# Patient Record
Sex: FEMALE | Race: WHITE | Employment: OTHER | URBAN - METROPOLITAN AREA
[De-identification: names, ages, dates, MRNs, and addresses within clinical notes are randomized per-mention and may not be internally consistent; named-entity substitution may affect disease eponyms.]

---

## 2019-10-25 DIAGNOSIS — M81.0 OSTEOPOROSIS, UNSPECIFIED OSTEOPOROSIS TYPE, UNSPECIFIED PATHOLOGICAL FRACTURE PRESENCE: ICD-10-CM

## 2019-10-30 ENCOUNTER — HOSPITAL ENCOUNTER (OUTPATIENT)
Age: 74
Discharge: HOME | End: 2019-11-01

## 2019-10-30 PROCEDURE — 88173 CYTOPATH EVAL FNA REPORT: CPT

## 2019-10-30 PROCEDURE — 88305 TISSUE EXAM BY PATHOLOGIST: CPT

## 2020-03-19 ENCOUNTER — APPOINTMENT (OUTPATIENT)
Dept: CT IMAGING | Age: 75
DRG: 193 | End: 2020-03-19
Payer: MEDICARE

## 2020-03-19 ENCOUNTER — APPOINTMENT (OUTPATIENT)
Dept: GENERAL RADIOLOGY | Age: 75
DRG: 193 | End: 2020-03-19
Payer: MEDICARE

## 2020-03-19 ENCOUNTER — HOSPITAL ENCOUNTER (INPATIENT)
Age: 75
LOS: 18 days | Discharge: HOME HEALTH | DRG: 193 | End: 2020-04-06
Attending: EMERGENCY MEDICINE | Admitting: INTERNAL MEDICINE
Payer: MEDICARE

## 2020-03-19 DIAGNOSIS — J18.9 PNEUMONIA DUE TO INFECTIOUS ORGANISM, UNSPECIFIED LATERALITY, UNSPECIFIED PART OF LUNG: Primary | ICD-10-CM

## 2020-03-19 LAB
ADENOVIRUS BY PCR: NOT DETECTED
ALBUMIN SERPL-MCNC: 3.7 G/DL (ref 3.5–5.2)
ALP BLD-CCNC: 88 U/L (ref 35–104)
ALT SERPL-CCNC: 40 U/L (ref 0–32)
ANION GAP SERPL CALCULATED.3IONS-SCNC: 18 MMOL/L (ref 7–16)
AST SERPL-CCNC: 54 U/L (ref 0–31)
B.E.: 1.8 MMOL/L (ref -3–0)
BASOPHILS ABSOLUTE: 0.01 E9/L (ref 0–0.2)
BASOPHILS RELATIVE PERCENT: 0.1 % (ref 0–2)
BILIRUB SERPL-MCNC: 0.4 MG/DL (ref 0–1.2)
BORDETELLA PARAPERTUSSIS BY PCR: NOT DETECTED
BORDETELLA PERTUSSIS BY PCR: NOT DETECTED
BUN BLDV-MCNC: 9 MG/DL (ref 8–23)
CALCIUM SERPL-MCNC: 8.4 MG/DL (ref 8.6–10.2)
CHLAMYDOPHILIA PNEUMONIAE BY PCR: NOT DETECTED
CHLORIDE BLD-SCNC: 97 MMOL/L (ref 98–107)
CO2: 20 MMOL/L (ref 22–29)
CORONAVIRUS 229E BY PCR: NOT DETECTED
CORONAVIRUS HKU1 BY PCR: NOT DETECTED
CORONAVIRUS NL63 BY PCR: NOT DETECTED
CORONAVIRUS OC43 BY PCR: NOT DETECTED
CREAT SERPL-MCNC: 0.8 MG/DL (ref 0.5–1)
CRITICAL NOTIFICATION: YES
DEVICE: ABNORMAL
EOSINOPHILS ABSOLUTE: 0 E9/L (ref 0.05–0.5)
EOSINOPHILS RELATIVE PERCENT: 0 % (ref 0–6)
GFR AFRICAN AMERICAN: >60
GFR NON-AFRICAN AMERICAN: >60 ML/MIN/1.73
GLUCOSE BLD-MCNC: 129 MG/DL (ref 74–99)
HCO3 ARTERIAL: 20.8 MMOL/L (ref 22–26)
HCT VFR BLD CALC: 40 % (ref 34–48)
HEMOGLOBIN: 13.9 G/DL (ref 11.5–15.5)
HUMAN METAPNEUMOVIRUS BY PCR: NOT DETECTED
HUMAN RHINOVIRUS/ENTEROVIRUS BY PCR: NOT DETECTED
IMMATURE GRANULOCYTES #: 0.03 E9/L
IMMATURE GRANULOCYTES %: 0.3 % (ref 0–5)
INFLUENZA A BY PCR: NOT DETECTED
INFLUENZA A BY PCR: NOT DETECTED
INFLUENZA B BY PCR: NOT DETECTED
INFLUENZA B BY PCR: NOT DETECTED
LACTIC ACID: 1.7 MMOL/L (ref 0.5–2.2)
LYMPHOCYTES ABSOLUTE: 0.53 E9/L (ref 1.5–4)
LYMPHOCYTES RELATIVE PERCENT: 5.9 % (ref 20–42)
MCH RBC QN AUTO: 29.3 PG (ref 26–35)
MCHC RBC AUTO-ENTMCNC: 34.8 % (ref 32–34.5)
MCV RBC AUTO: 84.2 FL (ref 80–99.9)
MONOCYTES ABSOLUTE: 0.29 E9/L (ref 0.1–0.95)
MONOCYTES RELATIVE PERCENT: 3.3 % (ref 2–12)
MYCOPLASMA PNEUMONIAE BY PCR: NOT DETECTED
NEUTROPHILS ABSOLUTE: 8.06 E9/L (ref 1.8–7.3)
NEUTROPHILS RELATIVE PERCENT: 90.4 % (ref 43–80)
O2 SATURATION: 93.5 % (ref 92–98.5)
OPERATOR ID: 754
PARAINFLUENZA VIRUS 1 BY PCR: NOT DETECTED
PARAINFLUENZA VIRUS 2 BY PCR: NOT DETECTED
PARAINFLUENZA VIRUS 3 BY PCR: NOT DETECTED
PARAINFLUENZA VIRUS 4 BY PCR: NOT DETECTED
PCO2 ARTERIAL: 21.3 MMHG (ref 35–45)
PDW BLD-RTO: 13.5 FL (ref 11.5–15)
PH BLOOD GAS: 7.6 (ref 7.35–7.45)
PLATELET # BLD: 230 E9/L (ref 130–450)
PMV BLD AUTO: 9.1 FL (ref 7–12)
PO2 ARTERIAL: 54.5 MMHG (ref 60–80)
POTASSIUM SERPL-SCNC: 3.1 MMOL/L (ref 3.5–5)
PRO-BNP: 315 PG/ML (ref 0–450)
RBC # BLD: 4.75 E12/L (ref 3.5–5.5)
RESPIRATORY SYNCYTIAL VIRUS BY PCR: NOT DETECTED
SEDIMENTATION RATE, ERYTHROCYTE: 1 MM/HR (ref 0–20)
SODIUM BLD-SCNC: 135 MMOL/L (ref 132–146)
SOURCE, BLOOD GAS: ABNORMAL
TOTAL PROTEIN: 6.7 G/DL (ref 6.4–8.3)
TROPONIN: <0.01 NG/ML (ref 0–0.03)
WBC # BLD: 8.9 E9/L (ref 4.5–11.5)

## 2020-03-19 PROCEDURE — 93005 ELECTROCARDIOGRAM TRACING: CPT | Performed by: PHYSICIAN ASSISTANT

## 2020-03-19 PROCEDURE — 6360000002 HC RX W HCPCS: Performed by: PHYSICIAN ASSISTANT

## 2020-03-19 PROCEDURE — 96375 TX/PRO/DX INJ NEW DRUG ADDON: CPT

## 2020-03-19 PROCEDURE — 6360000002 HC RX W HCPCS: Performed by: EMERGENCY MEDICINE

## 2020-03-19 PROCEDURE — 36415 COLL VENOUS BLD VENIPUNCTURE: CPT

## 2020-03-19 PROCEDURE — 2580000003 HC RX 258: Performed by: PHYSICIAN ASSISTANT

## 2020-03-19 PROCEDURE — 84484 ASSAY OF TROPONIN QUANT: CPT

## 2020-03-19 PROCEDURE — 96365 THER/PROPH/DIAG IV INF INIT: CPT

## 2020-03-19 PROCEDURE — 99285 EMERGENCY DEPT VISIT HI MDM: CPT

## 2020-03-19 PROCEDURE — 82803 BLOOD GASES ANY COMBINATION: CPT

## 2020-03-19 PROCEDURE — 80053 COMPREHEN METABOLIC PANEL: CPT

## 2020-03-19 PROCEDURE — 6370000000 HC RX 637 (ALT 250 FOR IP): Performed by: PHYSICIAN ASSISTANT

## 2020-03-19 PROCEDURE — 83880 ASSAY OF NATRIURETIC PEPTIDE: CPT

## 2020-03-19 PROCEDURE — 85025 COMPLETE CBC W/AUTO DIFF WBC: CPT

## 2020-03-19 PROCEDURE — 94640 AIRWAY INHALATION TREATMENT: CPT

## 2020-03-19 PROCEDURE — 85651 RBC SED RATE NONAUTOMATED: CPT

## 2020-03-19 PROCEDURE — 87150 DNA/RNA AMPLIFIED PROBE: CPT

## 2020-03-19 PROCEDURE — 0100U HC RESPIRPTHGN MULT REV TRANS & AMP PRB TECH 21 TRGT: CPT

## 2020-03-19 PROCEDURE — 87502 INFLUENZA DNA AMP PROBE: CPT

## 2020-03-19 PROCEDURE — 71250 CT THORAX DX C-: CPT

## 2020-03-19 PROCEDURE — 6370000000 HC RX 637 (ALT 250 FOR IP): Performed by: INTERNAL MEDICINE

## 2020-03-19 PROCEDURE — 71045 X-RAY EXAM CHEST 1 VIEW: CPT

## 2020-03-19 PROCEDURE — 83605 ASSAY OF LACTIC ACID: CPT

## 2020-03-19 PROCEDURE — U0002 COVID-19 LAB TEST NON-CDC: HCPCS

## 2020-03-19 PROCEDURE — 99223 1ST HOSP IP/OBS HIGH 75: CPT | Performed by: INTERNAL MEDICINE

## 2020-03-19 PROCEDURE — 87040 BLOOD CULTURE FOR BACTERIA: CPT

## 2020-03-19 PROCEDURE — 2060000000 HC ICU INTERMEDIATE R&B

## 2020-03-19 RX ORDER — SODIUM CHLORIDE 0.9 % (FLUSH) 0.9 %
10 SYRINGE (ML) INJECTION EVERY 12 HOURS SCHEDULED
Status: DISCONTINUED | OUTPATIENT
Start: 2020-03-20 | End: 2020-04-06 | Stop reason: HOSPADM

## 2020-03-19 RX ORDER — ALBUTEROL SULFATE 90 UG/1
4 AEROSOL, METERED RESPIRATORY (INHALATION) ONCE
Status: COMPLETED | OUTPATIENT
Start: 2020-03-19 | End: 2020-03-19

## 2020-03-19 RX ORDER — OMEGA-3S/DHA/EPA/FISH OIL/D3 300MG-1000
800 CAPSULE ORAL DAILY
Status: DISCONTINUED | OUTPATIENT
Start: 2020-03-20 | End: 2020-04-06 | Stop reason: HOSPADM

## 2020-03-19 RX ORDER — POTASSIUM CHLORIDE 20 MEQ/1
40 TABLET, EXTENDED RELEASE ORAL ONCE
Status: COMPLETED | OUTPATIENT
Start: 2020-03-19 | End: 2020-03-19

## 2020-03-19 RX ORDER — M-VIT,TX,IRON,MINS/CALC/FOLIC 27MG-0.4MG
1 TABLET ORAL DAILY
COMMUNITY

## 2020-03-19 RX ORDER — HYDROXYCHLOROQUINE SULFATE 200 MG/1
200 TABLET, FILM COATED ORAL DAILY
Status: DISCONTINUED | OUTPATIENT
Start: 2020-03-20 | End: 2020-03-19

## 2020-03-19 RX ORDER — ACETAMINOPHEN 500 MG
500 TABLET ORAL EVERY 6 HOURS PRN
COMMUNITY

## 2020-03-19 RX ORDER — POLYETHYLENE GLYCOL 3350 17 G/17G
17 POWDER, FOR SOLUTION ORAL DAILY PRN
Status: DISCONTINUED | OUTPATIENT
Start: 2020-03-19 | End: 2020-04-06 | Stop reason: HOSPADM

## 2020-03-19 RX ORDER — SODIUM CHLORIDE 0.9 % (FLUSH) 0.9 %
10 SYRINGE (ML) INJECTION PRN
Status: DISCONTINUED | OUTPATIENT
Start: 2020-03-19 | End: 2020-04-06 | Stop reason: HOSPADM

## 2020-03-19 RX ORDER — SULFAMETHOXAZOLE AND TRIMETHOPRIM 800; 160 MG/1; MG/1
1 TABLET ORAL 2 TIMES DAILY
Status: ON HOLD | COMMUNITY
End: 2020-04-05 | Stop reason: HOSPADM

## 2020-03-19 RX ORDER — LEVOTHYROXINE SODIUM 0.07 MG/1
75 TABLET ORAL DAILY
COMMUNITY

## 2020-03-19 RX ORDER — LEVOTHYROXINE SODIUM 0.07 MG/1
75 TABLET ORAL DAILY
Status: DISCONTINUED | OUTPATIENT
Start: 2020-03-20 | End: 2020-04-06 | Stop reason: HOSPADM

## 2020-03-19 RX ORDER — AZITHROMYCIN 250 MG/1
500 TABLET, FILM COATED ORAL ONCE
Status: COMPLETED | OUTPATIENT
Start: 2020-03-19 | End: 2020-03-19

## 2020-03-19 RX ORDER — PROMETHAZINE HYDROCHLORIDE 25 MG/1
12.5 TABLET ORAL EVERY 6 HOURS PRN
Status: DISCONTINUED | OUTPATIENT
Start: 2020-03-19 | End: 2020-04-06 | Stop reason: HOSPADM

## 2020-03-19 RX ORDER — ONDANSETRON 2 MG/ML
4 INJECTION INTRAMUSCULAR; INTRAVENOUS EVERY 6 HOURS PRN
Status: DISCONTINUED | OUTPATIENT
Start: 2020-03-19 | End: 2020-04-06 | Stop reason: HOSPADM

## 2020-03-19 RX ORDER — SUMATRIPTAN 50 MG/1
50 TABLET, FILM COATED ORAL
COMMUNITY

## 2020-03-19 RX ORDER — KETOROLAC TROMETHAMINE 30 MG/ML
15 INJECTION, SOLUTION INTRAMUSCULAR; INTRAVENOUS ONCE
Status: COMPLETED | OUTPATIENT
Start: 2020-03-19 | End: 2020-03-19

## 2020-03-19 RX ORDER — METHYLDOPA 250 MG
TABLET ORAL
COMMUNITY

## 2020-03-19 RX ORDER — ACETAMINOPHEN 650 MG/1
650 SUPPOSITORY RECTAL EVERY 6 HOURS PRN
Status: DISCONTINUED | OUTPATIENT
Start: 2020-03-19 | End: 2020-04-06 | Stop reason: HOSPADM

## 2020-03-19 RX ORDER — HYDROXYCHLOROQUINE SULFATE 200 MG/1
200 TABLET, FILM COATED ORAL DAILY
Status: DISCONTINUED | OUTPATIENT
Start: 2020-03-19 | End: 2020-03-22

## 2020-03-19 RX ORDER — M-VIT,TX,IRON,MINS/CALC/FOLIC 27MG-0.4MG
1 TABLET ORAL DAILY
Status: DISCONTINUED | OUTPATIENT
Start: 2020-03-20 | End: 2020-04-06 | Stop reason: HOSPADM

## 2020-03-19 RX ORDER — ACETAMINOPHEN 325 MG/1
650 TABLET ORAL EVERY 6 HOURS PRN
Status: DISCONTINUED | OUTPATIENT
Start: 2020-03-19 | End: 2020-04-06 | Stop reason: HOSPADM

## 2020-03-19 RX ORDER — OMEGA-3S/DHA/EPA/FISH OIL/D3 300MG-1000
800 CAPSULE ORAL DAILY
COMMUNITY

## 2020-03-19 RX ORDER — SUMATRIPTAN 50 MG/1
50 TABLET, FILM COATED ORAL
Status: DISPENSED | OUTPATIENT
Start: 2020-03-19 | End: 2020-03-19

## 2020-03-19 RX ORDER — IBUPROFEN 200 MG
200 TABLET ORAL EVERY 6 HOURS PRN
Status: ON HOLD | COMMUNITY
End: 2020-04-05 | Stop reason: HOSPADM

## 2020-03-19 RX ADMIN — POTASSIUM CHLORIDE 40 MEQ: 1500 TABLET, EXTENDED RELEASE ORAL at 23:16

## 2020-03-19 RX ADMIN — HYDROXYCHLOROQUINE SULFATE 200 MG: 200 TABLET, FILM COATED ORAL at 23:16

## 2020-03-19 RX ADMIN — ALBUTEROL SULFATE 4 PUFF: 90 AEROSOL, METERED RESPIRATORY (INHALATION) at 20:34

## 2020-03-19 RX ADMIN — KETOROLAC TROMETHAMINE 15 MG: 30 INJECTION, SOLUTION INTRAMUSCULAR; INTRAVENOUS at 21:25

## 2020-03-19 RX ADMIN — AZITHROMYCIN 500 MG: 250 TABLET, FILM COATED ORAL at 21:25

## 2020-03-19 RX ADMIN — WATER 1 G: 1 INJECTION INTRAMUSCULAR; INTRAVENOUS; SUBCUTANEOUS at 21:25

## 2020-03-19 ASSESSMENT — PAIN DESCRIPTION - PROGRESSION: CLINICAL_PROGRESSION: GRADUALLY WORSENING

## 2020-03-19 ASSESSMENT — PAIN SCALES - GENERAL: PAINLEVEL_OUTOF10: 8

## 2020-03-19 ASSESSMENT — PAIN DESCRIPTION - PAIN TYPE: TYPE: ACUTE PAIN

## 2020-03-19 ASSESSMENT — PAIN - FUNCTIONAL ASSESSMENT: PAIN_FUNCTIONAL_ASSESSMENT: ACTIVITIES ARE NOT PREVENTED

## 2020-03-19 ASSESSMENT — PAIN DESCRIPTION - ONSET: ONSET: ON-GOING

## 2020-03-19 ASSESSMENT — PAIN DESCRIPTION - ORIENTATION: ORIENTATION: LEFT

## 2020-03-20 LAB
ACINETOBACTER BAUMANNII BY PCR: NOT DETECTED
ALBUMIN SERPL-MCNC: 3.3 G/DL (ref 3.5–5.2)
ALP BLD-CCNC: 81 U/L (ref 35–104)
ALT SERPL-CCNC: 35 U/L (ref 0–32)
ANION GAP SERPL CALCULATED.3IONS-SCNC: 15 MMOL/L (ref 7–16)
AST SERPL-CCNC: 42 U/L (ref 0–31)
BACTERIA: ABNORMAL /HPF
BASOPHILS ABSOLUTE: 0.01 E9/L (ref 0–0.2)
BASOPHILS RELATIVE PERCENT: 0.1 % (ref 0–2)
BILIRUB SERPL-MCNC: 0.3 MG/DL (ref 0–1.2)
BILIRUBIN URINE: NEGATIVE
BLOOD, URINE: ABNORMAL
BOTTLE TYPE: ABNORMAL
BUN BLDV-MCNC: 10 MG/DL (ref 8–23)
CALCIUM SERPL-MCNC: 8.3 MG/DL (ref 8.6–10.2)
CANDIDA ALBICANS BY PCR: NOT DETECTED
CANDIDA GLABRATA BY PCR: NOT DETECTED
CANDIDA KRUSEI BY PCR: NOT DETECTED
CANDIDA PARAPSILOSIS BY PCR: NOT DETECTED
CANDIDA TROPICALIS BY PCR: NOT DETECTED
CHLORIDE BLD-SCNC: 98 MMOL/L (ref 98–107)
CLARITY: CLEAR
CO2: 21 MMOL/L (ref 22–29)
COLOR: YELLOW
CREAT SERPL-MCNC: 0.8 MG/DL (ref 0.5–1)
EKG ATRIAL RATE: 97 BPM
EKG P AXIS: 85 DEGREES
EKG P-R INTERVAL: 186 MS
EKG Q-T INTERVAL: 418 MS
EKG QRS DURATION: 138 MS
EKG QTC CALCULATION (BAZETT): 530 MS
EKG R AXIS: -50 DEGREES
EKG T AXIS: 88 DEGREES
EKG VENTRICULAR RATE: 97 BPM
ENTEROBACTER CLOACAE COMPLEX BY PCR: NOT DETECTED
ENTEROBACTERALES BY PCR: NOT DETECTED
ENTEROCOCCUS BY PCR: NOT DETECTED
EOSINOPHILS ABSOLUTE: 0 E9/L (ref 0.05–0.5)
EOSINOPHILS RELATIVE PERCENT: 0 % (ref 0–6)
EPITHELIAL CELLS, UA: ABNORMAL /HPF
ESCHERICHIA COLI BY PCR: NOT DETECTED
GFR AFRICAN AMERICAN: >60
GFR NON-AFRICAN AMERICAN: >60 ML/MIN/1.73
GLUCOSE BLD-MCNC: 115 MG/DL (ref 74–99)
GLUCOSE URINE: NEGATIVE MG/DL
HAEMOPHILUS INFLUENZAE BY PCR: NOT DETECTED
HCT VFR BLD CALC: 39.2 % (ref 34–48)
HEMOGLOBIN: 13.3 G/DL (ref 11.5–15.5)
IMMATURE GRANULOCYTES #: 0.06 E9/L
IMMATURE GRANULOCYTES %: 0.7 % (ref 0–5)
KETONES, URINE: NEGATIVE MG/DL
KLEBSIELLA OXYTOCA BY PCR: NOT DETECTED
KLEBSIELLA PNEUMONIAE GROUP BY PCR: NOT DETECTED
L. PNEUMOPHILA SEROGP 1 UR AG: NORMAL
LEUKOCYTE ESTERASE, URINE: NEGATIVE
LISTERIA MONOCYTOGENES BY PCR: NOT DETECTED
LYMPHOCYTES ABSOLUTE: 0.6 E9/L (ref 1.5–4)
LYMPHOCYTES RELATIVE PERCENT: 7.2 % (ref 20–42)
MCH RBC QN AUTO: 29 PG (ref 26–35)
MCHC RBC AUTO-ENTMCNC: 33.9 % (ref 32–34.5)
MCV RBC AUTO: 85.6 FL (ref 80–99.9)
METHICILLIN RESISTANCE MECA/C  BY PCR: NOT DETECTED
MONOCYTES ABSOLUTE: 0.21 E9/L (ref 0.1–0.95)
MONOCYTES RELATIVE PERCENT: 2.5 % (ref 2–12)
NEISSERIA MENINGITIDIS BY PCR: NOT DETECTED
NEUTROPHILS ABSOLUTE: 7.45 E9/L (ref 1.8–7.3)
NEUTROPHILS RELATIVE PERCENT: 89.5 % (ref 43–80)
NITRITE, URINE: NEGATIVE
ORDER NUMBER: ABNORMAL
PDW BLD-RTO: 13.7 FL (ref 11.5–15)
PH UA: 6 (ref 5–9)
PLATELET # BLD: 232 E9/L (ref 130–450)
PMV BLD AUTO: 9.3 FL (ref 7–12)
POTASSIUM SERPL-SCNC: 3.5 MMOL/L (ref 3.5–5)
PROTEIN UA: 30 MG/DL
PROTEUS BY PCR: NOT DETECTED
PSEUDOMONAS AERUGINOSA BY PCR: NOT DETECTED
RBC # BLD: 4.58 E12/L (ref 3.5–5.5)
RBC UA: ABNORMAL /HPF (ref 0–2)
SERRATIA MARCESCENS BY PCR: NOT DETECTED
SODIUM BLD-SCNC: 134 MMOL/L (ref 132–146)
SOURCE OF BLOOD CULTURE: ABNORMAL
SPECIFIC GRAVITY UA: >=1.03 (ref 1–1.03)
STAPHYLOCOCCUS AUREUS BY PCR: NOT DETECTED
STAPHYLOCOCCUS SPECIES BY PCR: DETECTED
STREP PNEUMONIAE ANTIGEN, URINE: NORMAL
STREPTOCOCCUS AGALACTIAE BY PCR: NOT DETECTED
STREPTOCOCCUS PNEUMONIAE BY PCR: NOT DETECTED
STREPTOCOCCUS PYOGENES  BY PCR: NOT DETECTED
STREPTOCOCCUS SPECIES BY PCR: NOT DETECTED
TOTAL PROTEIN: 6.5 G/DL (ref 6.4–8.3)
UROBILINOGEN, URINE: 0.2 E.U./DL
WBC # BLD: 8.3 E9/L (ref 4.5–11.5)
WBC UA: ABNORMAL /HPF (ref 0–5)

## 2020-03-20 PROCEDURE — 6370000000 HC RX 637 (ALT 250 FOR IP): Performed by: INTERNAL MEDICINE

## 2020-03-20 PROCEDURE — 6360000002 HC RX W HCPCS: Performed by: INTERNAL MEDICINE

## 2020-03-20 PROCEDURE — 36415 COLL VENOUS BLD VENIPUNCTURE: CPT

## 2020-03-20 PROCEDURE — 6370000000 HC RX 637 (ALT 250 FOR IP): Performed by: SPECIALIST

## 2020-03-20 PROCEDURE — 80053 COMPREHEN METABOLIC PANEL: CPT

## 2020-03-20 PROCEDURE — 2700000000 HC OXYGEN THERAPY PER DAY

## 2020-03-20 PROCEDURE — 85025 COMPLETE CBC W/AUTO DIFF WBC: CPT

## 2020-03-20 PROCEDURE — 2580000003 HC RX 258: Performed by: INTERNAL MEDICINE

## 2020-03-20 PROCEDURE — 93010 ELECTROCARDIOGRAM REPORT: CPT | Performed by: INTERNAL MEDICINE

## 2020-03-20 PROCEDURE — 81001 URINALYSIS AUTO W/SCOPE: CPT

## 2020-03-20 PROCEDURE — 87088 URINE BACTERIA CULTURE: CPT

## 2020-03-20 PROCEDURE — 99233 SBSQ HOSP IP/OBS HIGH 50: CPT | Performed by: INTERNAL MEDICINE

## 2020-03-20 PROCEDURE — 2060000000 HC ICU INTERMEDIATE R&B

## 2020-03-20 PROCEDURE — 87450 HC DIRECT STREP B ANTIGEN: CPT

## 2020-03-20 RX ORDER — AZITHROMYCIN 250 MG/1
500 TABLET, FILM COATED ORAL ONCE
Status: COMPLETED | OUTPATIENT
Start: 2020-03-20 | End: 2020-03-20

## 2020-03-20 RX ORDER — HYDROXYCHLOROQUINE SULFATE 200 MG/1
400 TABLET, FILM COATED ORAL 2 TIMES DAILY
Status: COMPLETED | OUTPATIENT
Start: 2020-03-20 | End: 2020-03-21

## 2020-03-20 RX ORDER — HYDROXYCHLOROQUINE SULFATE 200 MG/1
200 TABLET, FILM COATED ORAL 2 TIMES DAILY
Status: DISCONTINUED | OUTPATIENT
Start: 2020-03-21 | End: 2020-04-01

## 2020-03-20 RX ADMIN — HYDROXYCHLOROQUINE SULFATE 400 MG: 200 TABLET, FILM COATED ORAL at 21:55

## 2020-03-20 RX ADMIN — Medication 1 TABLET: at 08:23

## 2020-03-20 RX ADMIN — Medication 10 ML: at 08:31

## 2020-03-20 RX ADMIN — ACETAMINOPHEN 650 MG: 325 TABLET ORAL at 00:17

## 2020-03-20 RX ADMIN — Medication 10 ML: at 22:08

## 2020-03-20 RX ADMIN — HYDROXYCHLOROQUINE SULFATE 200 MG: 200 TABLET, FILM COATED ORAL at 08:23

## 2020-03-20 RX ADMIN — AZITHROMYCIN MONOHYDRATE 500 MG: 250 TABLET ORAL at 21:55

## 2020-03-20 RX ADMIN — ACETAMINOPHEN 650 MG: 325 TABLET ORAL at 08:22

## 2020-03-20 RX ADMIN — CHOLECALCIFEROL TAB 10 MCG (400 UNIT) 800 UNITS: 10 TAB at 08:23

## 2020-03-20 RX ADMIN — LEVOTHYROXINE SODIUM 75 MCG: 75 TABLET ORAL at 05:10

## 2020-03-20 RX ADMIN — WATER 1 G: 1 INJECTION INTRAMUSCULAR; INTRAVENOUS; SUBCUTANEOUS at 21:54

## 2020-03-20 RX ADMIN — ENOXAPARIN SODIUM 40 MG: 40 INJECTION SUBCUTANEOUS at 08:23

## 2020-03-20 RX ADMIN — ACETAMINOPHEN 650 MG: 325 TABLET ORAL at 16:05

## 2020-03-20 ASSESSMENT — PAIN SCALES - GENERAL: PAINLEVEL_OUTOF10: 0

## 2020-03-20 ASSESSMENT — PAIN DESCRIPTION - PAIN TYPE: TYPE: ACUTE PAIN

## 2020-03-20 ASSESSMENT — PAIN DESCRIPTION - LOCATION: LOCATION: GROIN;OTHER (COMMENT)

## 2020-03-20 NOTE — PROGRESS NOTES
Spoke to nurse from ED, received sbar. Asked nurse to collect the COVID-19 PCR prior to sending the patient to .

## 2020-03-20 NOTE — ED NOTES
SBAR to floor, rec'd, report to floor, resp panel pending for covid swab add on, explained to floor     Julia Hunter RN  03/19/20 0343

## 2020-03-20 NOTE — H&P
75 MCG tablet Take 75 mcg by mouth Daily   Yes Historical Provider, MD   Multiple Vitamins-Minerals (THERAPEUTIC MULTIVITAMIN-MINERALS) tablet Take 1 tablet by mouth daily   Yes Historical Provider, MD   vitamin D3 (CHOLECALCIFEROL) 10 MCG (400 UNIT) TABS tablet Take 800 Units by mouth daily   Yes Historical Provider, MD   Bioflavonoid Products (HUBER-C) TABS Take by mouth   Yes Historical Provider, MD   SUMAtriptan (IMITREX) 50 MG tablet Take 50 mg by mouth once as needed for Migraine   Yes Historical Provider, MD   ESTROPIPATE PO Take 0.625 mg by mouth daily   Yes Historical Provider, MD       Allergies:    Ciprofloxacin and Pcn [penicillins]    Social History:    Don't smoke or drink alcohol    Family History:   family history is not on file. No heart disease or cancer in her family.       PHYSICAL EXAM:  Vitals:  BP (!) 147/66   Pulse 101   Temp 98.5 °F (36.9 °C) (Oral)   Resp 28   Ht 5' 3\" (1.6 m)   Wt 135 lb (61.2 kg)   SpO2 94%   BMI 23.91 kg/m²     General Appearance: alert and oriented to person, place and time and in no acute distress  Skin: warm and dry  Head: normocephalic and atraumatic  Eyes: pupils equal, round, and reactive to light, extraocular eye movements intact, conjunctivae normal  Neck: neck supple and non tender without mass   Pulmonary/Chest: clear to auscultation bilaterally- no wheezes, rales or rhonchi, normal air movement, no respiratory distress  Cardiovascular: normal rate, normal S1 and S2 and no carotid bruits  Abdomen: soft, non-tender, non-distended, normal bowel sounds, no masses or organomegaly  Extremities: no cyanosis, no clubbing and no edema  Neurologic: no cranial nerve deficit and speech normal        LABS:  Recent Labs     03/19/20 1927      K 3.1*   CL 97*   CO2 20*   BUN 9   CREATININE 0.8   GLUCOSE 129*   CALCIUM 8.4*       Recent Labs     03/19/20 1927   WBC 8.9   RBC 4.75   HGB 13.9   HCT 40.0   MCV 84.2   MCH 29.3   MCHC 34.8*   RDW 13.5

## 2020-03-20 NOTE — ED PROVIDER NOTES
ED Attending  CC: No       Department of Emergency Medicine   ED  Provider Note  Admit Date/RoomTime: 3/19/2020  6:49 PM  ED Room: 06/06   Chief Complaint   Fever (highest of 101.0); Fatigue (fatigue/weakness x 1 week); and Cough (occasional dry cough)    History of Present Illness   Source of history provided by:  patient. History/Exam Limitations: none. Gena Nascimento is a 75 y.o. old female who has a past medical history of: No past medical history on file. presents to the emergency department by private vehicle, with complaints of nonproductive cough, fever, fatigue/weakness x1 week. Fever of 101 at home. Patient was tested for influenza on Monday by her PCP. Patient denies recent travel or sick contacts. Denies headache, vision change, dizziness, hemoptysis, chest pain, dyspnea, abdominal pain, NVD, numbness/weakness. ROS   Pertinent positives and negatives are stated within HPI, all other systems reviewed and are negative. No past surgical history on file. Social History:    Family History: family history is not on file. Allergies: Ciprofloxacin and Pcn [penicillins]    Physical Exam           ED Triage Vitals   BP Temp Temp Source Pulse Resp SpO2 Height Weight   03/19/20 1853 03/19/20 1848 03/19/20 1848 03/19/20 1848 03/19/20 1848 03/19/20 1848 03/19/20 1852 03/19/20 1852   (!) 159/72 98.5 °F (36.9 °C) Oral 95 18 97 % 5' 3\" (1.6 m) 135 lb (61.2 kg)      Oxygen Saturation Interpretation: Abnormal for patient. Constitutional:  Alert, development consistent with age. HEENT:  NC/NT. Airway patent. Neck:  Normal ROM. Supple. Respiratory:  Breath sounds: equal bilaterally. Lung sounds: diminished breath sounds- throughout. CV:  Regular rate and rhythm, normal heart sounds, without pathological murmurs, ectopy, gallops, or rubs. .  GI:  Abdomen Soft, nontender, good bowel sounds. No firm or pulsatile mass. Integument:  Normal turgor. Warm, dry, without visible rash.   Lymphatic: Edema:  none Bilateral lower extremity(s). Neurological:  Oriented. Motor functions intact.     Lab / Imaging Results   (All laboratory and radiology results have been personally reviewed by myself)  Labs:  Results for orders placed or performed during the hospital encounter of 03/19/20   Rapid influenza A/B antigens   Result Value Ref Range    Influenza A by PCR Not Detected Not Detected    Influenza B by PCR Not Detected Not Detected   CBC auto differential   Result Value Ref Range    WBC 8.9 4.5 - 11.5 E9/L    RBC 4.75 3.50 - 5.50 E12/L    Hemoglobin 13.9 11.5 - 15.5 g/dL    Hematocrit 40.0 34.0 - 48.0 %    MCV 84.2 80.0 - 99.9 fL    MCH 29.3 26.0 - 35.0 pg    MCHC 34.8 (H) 32.0 - 34.5 %    RDW 13.5 11.5 - 15.0 fL    Platelets 205 297 - 366 E9/L    MPV 9.1 7.0 - 12.0 fL    Neutrophils % 90.4 (H) 43.0 - 80.0 %    Immature Granulocytes % 0.3 0.0 - 5.0 %    Lymphocytes % 5.9 (L) 20.0 - 42.0 %    Monocytes % 3.3 2.0 - 12.0 %    Eosinophils % 0.0 0.0 - 6.0 %    Basophils % 0.1 0.0 - 2.0 %    Neutrophils Absolute 8.06 (H) 1.80 - 7.30 E9/L    Immature Granulocytes # 0.03 E9/L    Lymphocytes Absolute 0.53 (L) 1.50 - 4.00 E9/L    Monocytes Absolute 0.29 0.10 - 0.95 E9/L    Eosinophils Absolute 0.00 (L) 0.05 - 0.50 E9/L    Basophils Absolute 0.01 0.00 - 0.20 E9/L   Comprehensive Metabolic Panel   Result Value Ref Range    Sodium 135 132 - 146 mmol/L    Potassium 3.1 (L) 3.5 - 5.0 mmol/L    Chloride 97 (L) 98 - 107 mmol/L    CO2 20 (L) 22 - 29 mmol/L    Anion Gap 18 (H) 7 - 16 mmol/L    Glucose 129 (H) 74 - 99 mg/dL    BUN 9 8 - 23 mg/dL    CREATININE 0.8 0.5 - 1.0 mg/dL    GFR Non-African American >60 >=60 mL/min/1.73    GFR African American >60     Calcium 8.4 (L) 8.6 - 10.2 mg/dL    Total Protein 6.7 6.4 - 8.3 g/dL    Alb 3.7 3.5 - 5.2 g/dL    Total Bilirubin 0.4 0.0 - 1.2 mg/dL    Alkaline Phosphatase 88 35 - 104 U/L    ALT 40 (H) 0 - 32 U/L    AST 54 (H) 0 - 31 U/L   Lactic Acid, Plasma   Result Value Ref Range Lactic Acid 1.7 0.5 - 2.2 mmol/L   Sedimentation Rate   Result Value Ref Range    Sed Rate 1 0 - 20 mm/Hr   Troponin   Result Value Ref Range    Troponin <0.01 0.00 - 0.03 ng/mL   Brain Natriuretic Peptide   Result Value Ref Range    Pro- 0 - 450 pg/mL   Arterial Blood Gas, Respiratory Only   Result Value Ref Range    Source: Arterial     pH, Blood Gas 7.598 (H) 7.350 - 7.450    pCO2, Arterial 21.3 (L) 35.0 - 45.0 mmHg    pO2, Arterial 54.5 (L) 60.0 - 80.0 mmHg    HCO3, Arterial 20.8 (L) 22.0 - 26.0 mmol/L    B.E. 1.8 (H) -3.0 - 0.0 mmol/L    O2 Sat 93.5 92.0 - 98.5 %          DEVICE 15,065,521,400,820     Critical Notification Yes    EKG 12 Lead   Result Value Ref Range    Ventricular Rate 97 BPM    Atrial Rate 97 BPM    P-R Interval 186 ms    QRS Duration 138 ms    Q-T Interval 418 ms    QTc Calculation (Bazett) 530 ms    P Axis 85 degrees    R Axis -50 degrees    T Axis 88 degrees     Imaging: All Radiology results interpreted by Radiologist unless otherwise noted. CT Chest WO Contrast   Final Result      1. Patchy areas of groundglass opacity with interlobular septal   thickening seen within the bilateral upper and to a lesser extent   lower lobes, consistent with multilobar pneumonia. XR CHEST PORTABLE   Final Result      Findings consistent with multilobar pneumonia of the bilateral midlung   zones and left lung base. Blunting of the left costophrenic angle, suggests small left pleural   effusion.           ED Course / Medical Decision Making     Medications   potassium chloride (KLOR-CON M) extended release tablet 40 mEq (has no administration in time range)   hydroxychloroquine (PLAQUENIL) tablet 200 mg (has no administration in time range)   albuterol sulfate  (90 Base) MCG/ACT inhaler 4 puff (4 puffs Inhalation Given 3/19/20 2034)   cefTRIAXone (ROCEPHIN) 1 g in sterile water 10 mL IV syringe (0 g Intravenous Stopped 3/19/20 2154)   azithromycin [facility]) tablet 500 mg (500 mg Oral Given 3/19/20 2125)   ketorolac (TORADOL) injection 15 mg (15 mg Intravenous Given 3/19/20 2125)        Consult(s):   Hospitalist- 2212 spoke with Dr. Randene Baumgarten who will admit the patient    Procedure(s):   none    Medical Decision Making: Patient presenting with nonproductive cough, fever, fatigue x1 week. Patient is ill-appearing but is afebrile and in no acute distress. Patient denies recent travel or sick contacts. Patient denies cardiac history or history of COPD or asthma. Patient's x-ray and CT scan show multilobar pneumonia. Patient's influenza was negative so respiratory panel and Covid-19 testing will be added on. Patient was started on antibiotics in the ED in case of bacterial pneumonia but does appear to be more viral. Spoke with Dr. Randene Baumgarten who will admit the patient for multilobar pneumonia and COVID-19 r/o. Counseling: The emergency provider has spoken with the patient and discussed todays results, in addition to providing specific details for the plan of care and counseling regarding the diagnosis and prognosis. Questions are answered at this time and they are agreeable with the plan. Assessment      1. Pneumonia due to infectious organism, unspecified laterality, unspecified part of lung      Plan   Admit to med/surg floor  Patient condition is stable    New Medications     New Prescriptions    No medications on file     Electronically signed by Brennan Leigh PA-C   DD: 3/19/20  **This report was transcribed using voice recognition software. Every effort was made to ensure accuracy; however, inadvertent computerized transcription errors may be present.   END OF ED PROVIDER NOTE     Brennan Leigh PA-C  03/19/20 5310

## 2020-03-20 NOTE — CARE COORDINATION
3/20/2020 Introduced myself to patient and described my role as a . The patient lives alone in a one floor home She continues to drive . She does not use a cane. She has family support in the area. She denies DME. She plans to discharge to home. Informed her that a  and a  will follow her through the transition of care.

## 2020-03-20 NOTE — CONSULTS
[address] [place] [address] [facility]  ANGEL  Consultation Note     Admit Date: 3/19/2020  6:49 PM    Reason for Consult:   Positive blood cultures    Attending Physician:  Maurice Jenkins MD    HISTORY OF PRESENT ILLNESS:             The history is obtained from extensive review of available past medical records. The patient is a 75 y.o. female who presents to the ED on 3/19/2020 with generalized weakness and shortness of breath for about a week. She has a minimal cough. No headache. She had a low-grade temperature over 101 °F at home. She has been to [place] and has been at a gathering also in [place] a week prior to the admission. Rapid influenza and respiratory panel were negative. Urine antigens were negative. Chest CT showed multifocal groundglass infiltrates. She was admitted to the [address] [place] [address] unit. Blood cultures have turned positive in 1 out of 4 bottles with CoNS. Past Medical History:    History reviewed. No pertinent past medical history. Past Surgical History:    No past surgical history on file.   Current Medications:   Scheduled Meds:   azithromycin  500 mg Oral Once    cefTRIAXone (ROCEPHIN) IV  1 g Intravenous Once    vancomycin  20 mg/kg Intravenous Once    levothyroxine  75 mcg Oral Daily    therapeutic multivitamin-minerals  1 tablet Oral Daily    vitamin D3  800 Units Oral Daily    sodium chloride flush  10 mL Intravenous 2 times per day    enoxaparin  40 mg Subcutaneous Daily    hydroxychloroquine  200 mg Oral Daily     Continuous Infusions:  PRN Meds:sodium chloride flush, acetaminophen **OR** acetaminophen, polyethylene glycol, promethazine **OR** ondansetron    Allergies:  Ciprofloxacin and Pcn [penicillins]    Social History:   Social History     Socioeconomic History    Marital status:      Spouse name: None    Number of children: None    Years of education: None    Highest education level: None   Occupational History    None   Social Needs    expansion. Auscultation reveals scattered crackles posteriorly. Cardiovascular: S1 and S2 are rhythmic and regular. No murmurs appreciated. Abdomen: Positive bowel sounds to auscultation. Benign to palpation. No masses felt. No hepatosplenomegaly. Extremities: No clubbing, no cyanosis, no edema. Well-healed surgical wound left knee and ankle.   Lines: peripheral      CBC+dif:  Recent Labs     03/19/20  1927 03/20/20  0510   WBC 8.9 8.3   HGB 13.9 13.3   HCT 40.0 39.2   MCV 84.2 85.6    232   NEUTROABS 8.06* 7.45*     No results found for: CRP   No results found for: CRP  Lab Results   Component Value Date    SEDRATE 1 03/19/2020     Lab Results   Component Value Date    ALT 35 (H) 03/20/2020    AST 42 (H) 03/20/2020    ALKPHOS 81 03/20/2020    BILITOT 0.3 03/20/2020     Lab Results   Component Value Date     03/20/2020    K 3.5 03/20/2020    CL 98 03/20/2020    CO2 21 03/20/2020    BUN 10 03/20/2020    CREATININE 0.8 03/20/2020    GFRAA >60 03/20/2020    LABGLOM >60 03/20/2020    GLUCOSE 115 03/20/2020    PROT 6.5 03/20/2020    LABALBU 3.3 03/20/2020    CALCIUM 8.3 03/20/2020    BILITOT 0.3 03/20/2020    ALKPHOS 81 03/20/2020    AST 42 03/20/2020    ALT 35 03/20/2020       No results found for: PROTIME, INR    No results found for: TSH    Lab Results   Component Value Date    COLORU Yellow 03/20/2020    PHUR 6.0 03/20/2020    WBCUA NONE 03/20/2020    RBCUA 1-3 03/20/2020    BACTERIA MODERATE 03/20/2020    CLARITYU Clear 03/20/2020    SPECGRAV >=1.030 03/20/2020    LEUKOCYTESUR Negative 03/20/2020    UROBILINOGEN 0.2 03/20/2020    BILIRUBINUR Negative 03/20/2020    BLOODU TRACE-INTACT 03/20/2020    GLUCOSEU Negative 03/20/2020       Lab Results   Component Value Date    BE 1.8 03/19/2020    O2SAT 93.5 03/19/2020    PH 7.598 03/19/2020     Radiology:  CT of the chest reviewed    Microbiology:  Pending  Recent Labs     03/19/20  2006   BC Gram stain performed from blood culture bottle media  Gram

## 2020-03-20 NOTE — PROGRESS NOTES
[facility] Progress Note    Admitting Date and Time: 3/19/2020  6:49 PM  Admit Dx: Pneumonia [J18.9]  Pneumonia [J18.9]    Subjective:  Patient is being followed for Pneumonia [J18.9]  Pneumonia [J18.9]   Pt feels like he better, still short of breath and she feels tired      ROS: denies fever, chills, cp, n/v, HA unless stated above.       levothyroxine  75 mcg Oral Daily    therapeutic multivitamin-minerals  1 tablet Oral Daily    vitamin D3  800 Units Oral Daily    sodium chloride flush  10 mL Intravenous 2 times per day    enoxaparin  40 mg Subcutaneous Daily    hydroxychloroquine  200 mg Oral Daily     sodium chloride flush, 10 mL, PRN  acetaminophen, 650 mg, Q6H PRN    Or  acetaminophen, 650 mg, Q6H PRN  polyethylene glycol, 17 g, Daily PRN  promethazine, 12.5 mg, Q6H PRN    Or  ondansetron, 4 mg, Q6H PRN         Objective:    /66   Pulse 87   Temp 98.4 °F (36.9 °C) (Oral)   Resp 22   Ht 5' 3\" (1.6 m)   Wt 135 lb (61.2 kg)   SpO2 92%   BMI 23.91 kg/m²     General Appearance: alert and oriented to person, place and time and in mild to moderate acute distress  Skin: warm and dry  Head: normocephalic and atraumatic  Eyes: pupils equal, round, and reactive to light, extraocular eye movements intact, conjunctivae normal  Neck: neck supple and non tender without mass   Pulmonary/Chest: Crackles bilaterally- no wheezes, mild respiratory distress  Cardiovascular: normal rate, normal S1 and S2 and no carotid bruits  Abdomen: soft, non-tender, non-distended, normal bowel sounds, no masses or organomegaly  Extremities: no cyanosis, no clubbing and no edema  Neurologic: no cranial nerve deficit and speech normal        Recent Labs     03/19/20 1927 03/20/20  0510    134   K 3.1* 3.5   CL 97* 98   CO2 20* 21*   BUN 9 10   CREATININE 0.8 0.8   GLUCOSE 129* 115*   CALCIUM 8.4* 8.3*       Recent Labs     03/19/20 1927 03/20/20  0510   WBC 8.9 8.3   RBC 4.75 4.58   HGB 13.9 13.3

## 2020-03-21 LAB
C-REACTIVE PROTEIN: 27.2 MG/DL (ref 0–0.4)
SEDIMENTATION RATE, ERYTHROCYTE: 65 MM/HR (ref 0–20)

## 2020-03-21 PROCEDURE — 99233 SBSQ HOSP IP/OBS HIGH 50: CPT | Performed by: INTERNAL MEDICINE

## 2020-03-21 PROCEDURE — 85651 RBC SED RATE NONAUTOMATED: CPT

## 2020-03-21 PROCEDURE — 6370000000 HC RX 637 (ALT 250 FOR IP): Performed by: INTERNAL MEDICINE

## 2020-03-21 PROCEDURE — 2580000003 HC RX 258: Performed by: INTERNAL MEDICINE

## 2020-03-21 PROCEDURE — 6370000000 HC RX 637 (ALT 250 FOR IP): Performed by: SPECIALIST

## 2020-03-21 PROCEDURE — 2060000000 HC ICU INTERMEDIATE R&B

## 2020-03-21 PROCEDURE — 6360000002 HC RX W HCPCS: Performed by: INTERNAL MEDICINE

## 2020-03-21 PROCEDURE — 86140 C-REACTIVE PROTEIN: CPT

## 2020-03-21 PROCEDURE — 2700000000 HC OXYGEN THERAPY PER DAY

## 2020-03-21 PROCEDURE — 36415 COLL VENOUS BLD VENIPUNCTURE: CPT

## 2020-03-21 RX ORDER — POLYVINYL ALCOHOL 14 MG/ML
1 SOLUTION/ DROPS OPHTHALMIC PRN
Status: DISCONTINUED | OUTPATIENT
Start: 2020-03-21 | End: 2020-04-06 | Stop reason: HOSPADM

## 2020-03-21 RX ORDER — AZITHROMYCIN 250 MG/1
500 TABLET, FILM COATED ORAL DAILY
Status: DISCONTINUED | OUTPATIENT
Start: 2020-03-21 | End: 2020-03-21

## 2020-03-21 RX ORDER — AZITHROMYCIN 250 MG/1
500 TABLET, FILM COATED ORAL DAILY
Status: DISCONTINUED | OUTPATIENT
Start: 2020-03-21 | End: 2020-03-30

## 2020-03-21 RX ADMIN — Medication 10 ML: at 20:02

## 2020-03-21 RX ADMIN — HYDROXYCHLOROQUINE SULFATE 400 MG: 200 TABLET, FILM COATED ORAL at 07:50

## 2020-03-21 RX ADMIN — WATER 1 G: 1 INJECTION INTRAMUSCULAR; INTRAVENOUS; SUBCUTANEOUS at 20:01

## 2020-03-21 RX ADMIN — CHOLECALCIFEROL TAB 10 MCG (400 UNIT) 800 UNITS: 10 TAB at 07:50

## 2020-03-21 RX ADMIN — Medication 1 TABLET: at 07:49

## 2020-03-21 RX ADMIN — Medication 10 ML: at 07:50

## 2020-03-21 RX ADMIN — LEVOTHYROXINE SODIUM 75 MCG: 75 TABLET ORAL at 07:49

## 2020-03-21 RX ADMIN — AZITHROMYCIN MONOHYDRATE 500 MG: 250 TABLET ORAL at 20:00

## 2020-03-21 RX ADMIN — ACETAMINOPHEN 650 MG: 325 TABLET ORAL at 13:30

## 2020-03-21 RX ADMIN — HYDROXYCHLOROQUINE SULFATE 200 MG: 200 TABLET, FILM COATED ORAL at 17:55

## 2020-03-21 RX ADMIN — ENOXAPARIN SODIUM 40 MG: 40 INJECTION SUBCUTANEOUS at 07:49

## 2020-03-21 ASSESSMENT — PAIN SCALES - GENERAL
PAINLEVEL_OUTOF10: 1
PAINLEVEL_OUTOF10: 0

## 2020-03-21 NOTE — PROGRESS NOTES
[facility] Quality Flow/Interdisciplinary Rounds Progress Note        Quality Flow Rounds held on March 21, 2020    Disciplines Attending:  Bedside Nurse, ,  and Nursing Unit Leadership    Kareem Jenkins was admitted on 3/19/2020  6:49 PM    Anticipated Discharge Date:       Disposition:    Alek Score:  Alek Scale Score: 19    Readmission Risk              Risk of Unplanned Readmission:        8           Discussed patient goal for the day, patient clinical progression, and barriers to discharge.   The following Goal(s) of the Day/Commitment(s) have been identified:  Await diagnostic testing, maintain SPO2 >90        Angelica Bansal  March 21, 2020

## 2020-03-21 NOTE — PROGRESS NOTES
Dr. Nirmal Blakely on floor and made aware of O2 sat of 88-89% on 6L NC. Rt called to get high flow tubing.

## 2020-03-21 NOTE — PLAN OF CARE
Problem: Falls - Risk of:  Goal: Will remain free from falls  Description: Will remain free from falls  Outcome: Met This Shift     Problem: Pain:  Goal: Pain level will decrease  Description: Pain level will decrease  Outcome: Met This Shift     Problem: Pain:  Goal: Control of acute pain  Description: Control of acute pain  Outcome: Met This Shift     Problem: Physical Regulation:  Goal: Ability to maintain a body temperature in the normal range will improve  Description: Ability to maintain a body temperature in the normal range will improve  Outcome: Ongoing     Problem: Physical Regulation:  Goal: Ability to maintain vital signs within normal range will improve  Description: Ability to maintain vital signs within normal range will improve  Outcome: Ongoing

## 2020-03-21 NOTE — PROGRESS NOTES
WBC 8.9 8.3   RBC 4.75 4.58   HGB 13.9 13.3   HCT 40.0 39.2   MCV 84.2 85.6   MCH 29.3 29.0   MCHC 34.8* 33.9   RDW 13.5 13.7    232   MPV 9.1 9.3     Assessment:    Active Problems:    Pneumonia  Resolved Problems:    * No resolved hospital problems. *      Plan:  1. Acute hypoxic respiratory failure, oxygen saturation was below 90% on room air, increasing oxygen requirement, at least patient is requiring 6-7 L 90% of O2 to maintain oxygen saturation about 93 to 96%. Most likely due to pneumonia question bacterial versus viral versus a combination. She is low threshold to be transferred to the ICU for intubation. She is full code  2. Pneumonia, respiratory panel came back positive for staph,  Obtain sputum cultures, consult ID, continue on ceftriaxone and azithro, patient was started on plaquenil, we will discuss with ID. CT ? Viral pneumonia   3. Viral pneumonia, pending COVID-19 started on plaquenil  3. Bacteremia G+C in cultsters, rule out MRSA, will give a dose of vanc and consult ID. Most likely the source is the lungs, ID started the atpeitne on ceftriaxone and   4. Hypothyroidism, continue levothyroxine. NOTE: This report was transcribed using voice recognition software. Every effort was made to ensure accuracy; however, inadvertent computerized transcription errors may be present.   Electronically signed by Carissa Bourgeois MD on 3/21/2020 at 8:17 AM

## 2020-03-22 LAB
PROCALCITONIN: 0.13 NG/ML (ref 0–0.08)
URINE CULTURE, ROUTINE: NORMAL

## 2020-03-22 PROCEDURE — 6360000002 HC RX W HCPCS: Performed by: INTERNAL MEDICINE

## 2020-03-22 PROCEDURE — 6370000000 HC RX 637 (ALT 250 FOR IP): Performed by: INTERNAL MEDICINE

## 2020-03-22 PROCEDURE — 2700000000 HC OXYGEN THERAPY PER DAY

## 2020-03-22 PROCEDURE — 99232 SBSQ HOSP IP/OBS MODERATE 35: CPT | Performed by: INTERNAL MEDICINE

## 2020-03-22 PROCEDURE — 2580000003 HC RX 258: Performed by: INTERNAL MEDICINE

## 2020-03-22 PROCEDURE — 36415 COLL VENOUS BLD VENIPUNCTURE: CPT

## 2020-03-22 PROCEDURE — 2060000000 HC ICU INTERMEDIATE R&B

## 2020-03-22 PROCEDURE — 6370000000 HC RX 637 (ALT 250 FOR IP): Performed by: SPECIALIST

## 2020-03-22 PROCEDURE — 84145 PROCALCITONIN (PCT): CPT

## 2020-03-22 RX ORDER — 0.9 % SODIUM CHLORIDE 0.9 %
500 INTRAVENOUS SOLUTION INTRAVENOUS ONCE
Status: DISCONTINUED | OUTPATIENT
Start: 2020-03-22 | End: 2020-03-22

## 2020-03-22 RX ORDER — BENZONATATE 100 MG/1
100 CAPSULE ORAL 3 TIMES DAILY PRN
Status: DISCONTINUED | OUTPATIENT
Start: 2020-03-22 | End: 2020-04-06 | Stop reason: HOSPADM

## 2020-03-22 RX ADMIN — LEVOTHYROXINE SODIUM 75 MCG: 75 TABLET ORAL at 06:08

## 2020-03-22 RX ADMIN — CHOLECALCIFEROL TAB 10 MCG (400 UNIT) 800 UNITS: 10 TAB at 08:42

## 2020-03-22 RX ADMIN — HYDROXYCHLOROQUINE SULFATE 200 MG: 200 TABLET, FILM COATED ORAL at 20:40

## 2020-03-22 RX ADMIN — HYDROXYCHLOROQUINE SULFATE 200 MG: 200 TABLET, FILM COATED ORAL at 08:41

## 2020-03-22 RX ADMIN — Medication 10 ML: at 20:40

## 2020-03-22 RX ADMIN — ENOXAPARIN SODIUM 40 MG: 40 INJECTION SUBCUTANEOUS at 08:41

## 2020-03-22 RX ADMIN — Medication 10 ML: at 08:42

## 2020-03-22 RX ADMIN — AZITHROMYCIN MONOHYDRATE 500 MG: 250 TABLET ORAL at 20:40

## 2020-03-22 RX ADMIN — Medication 1 TABLET: at 08:41

## 2020-03-22 ASSESSMENT — PAIN SCALES - GENERAL: PAINLEVEL_OUTOF10: 0

## 2020-03-22 NOTE — PROGRESS NOTES
[address] [place] [address] [facility]  AMIEIDA  Progress Note    SUBJECTIVE:  Chief Complaint   Patient presents with    Fever     highest of 101.0    Fatigue     fatigue/weakness x 1 week    Cough     occasional dry cough     The patient still has a dry cough. She is still having some dyspnea, especially on exertion. No nausea or vomiting. She did have some loose stools this morning. No fevers. Admits to fatigue, especially in the afternoon. Review of systems:  As stated above in the chief complaint, otherwise negative. Medications:  Scheduled Meds:   azithromycin  500 mg Oral Daily    hydroxychloroquine  200 mg Oral BID    levothyroxine  75 mcg Oral Daily    therapeutic multivitamin-minerals  1 tablet Oral Daily    vitamin D3  800 Units Oral Daily    sodium chloride flush  10 mL Intravenous 2 times per day    enoxaparin  40 mg Subcutaneous Daily     Continuous Infusions:  PRN Meds:polyvinyl alcohol, sodium chloride flush, acetaminophen **OR** acetaminophen, polyethylene glycol, promethazine **OR** ondansetron    OBJECTIVE:  BP (!) 108/58 Comment: manual  Pulse 84   Temp 97.9 °F (36.6 °C) (Oral)   Resp 20   Ht 5' 3\" (1.6 m)   Wt 135 lb (61.2 kg)   SpO2 92%   BMI 23.91 kg/m²   Temp  Av.3 °F (36.8 °C)  Min: 97.9 °F (36.6 °C)  Max: 98.6 °F (37 °C)  Constitutional: The patient is awake, alert, and oriented. Skin: Warm and dry. No rashes were noted. HEENT: Round and reactive pupils. Moist mucous membranes. No ulcerations or thrush. Neck: Supple to movements. Chest: Crackles on lateral lung fields. Cardiovascular: Heart sounds rhythmic and regular. Abdomen: Positive bowel sounds to auscultation. Benign to palpation. Extremities: No edema.   Lines: peripheral    Laboratory and Tests Review:  Lab Results   Component Value Date    WBC 8.3 2020    WBC 8.9 2020    HGB 13.3 2020    HCT 39.2 2020    MCV 85.6 2020     2020     Lab Results   Component Value Date    NEUTROABS 7.45 (H) 03/20/2020    NEUTROABS 8.06 (H) 03/19/2020     No results found for: CRPHS  Lab Results   Component Value Date    ALT 35 (H) 03/20/2020    AST 42 (H) 03/20/2020    ALKPHOS 81 03/20/2020    BILITOT 0.3 03/20/2020     Lab Results   Component Value Date     03/20/2020    K 3.5 03/20/2020    CL 98 03/20/2020    CO2 21 03/20/2020    BUN 10 03/20/2020    CREATININE 0.8 03/20/2020    CREATININE 0.8 03/19/2020    GFRAA >60 03/20/2020    LABGLOM >60 03/20/2020    GLUCOSE 115 03/20/2020    PROT 6.5 03/20/2020    LABALBU 3.3 03/20/2020    CALCIUM 8.3 03/20/2020    BILITOT 0.3 03/20/2020    ALKPHOS 81 03/20/2020    AST 42 03/20/2020    ALT 35 03/20/2020     Lab Results   Component Value Date    CRP 27.2 (H) 03/21/2020     Lab Results   Component Value Date    SEDRATE 65 (H) 03/21/2020    SEDRATE 1 03/19/2020     Radiology:      Microbiology:   [address] NP swab by PCR: Pending  Blood cultures 3/19/2020 CoNS in 1 of 4 bottles  Respiratory culture: Ordered but not sent  Urine culture 3/20/2020 <10,000 GPO  Insert urine antigens  Respiratory panel: Negative  Rapid influenza: Negative  Procalcitonin: 0.13    ASSESSMENT:  · Possible SARS COV 2/COVID19 infection  · Probable viral pneumonia  · Fever associated to the above  · CoNS bacteremia. Contaminant    PLAN:  · Continue oral Azithromycin  · Continue Hydroxychloroquine  · Stop Ceftriaxone  · Check final cultures  · Continue droplet/contact isolation.   Patient is not in a negative pressure room    Patterson Charles  5:26 PM  3/22/2020

## 2020-03-22 NOTE — PROGRESS NOTES
[address] [place] [address] [facility]  AMIEIDA  Progress Note    SUBJECTIVE:  Chief Complaint   Patient presents with    Fever     highest of 101.0    Fatigue     fatigue/weakness x 1 week    Cough     occasional dry cough     The patient is feeling slightly better today. She had a couple of loose stools today  Still having a dry cough. No pain. Review of systems:  As stated above in the chief complaint, otherwise negative. Medications:  Scheduled Meds:   cefTRIAXone (ROCEPHIN) IV  1 g Intravenous Q24H    azithromycin  500 mg Oral Daily    hydroxychloroquine  200 mg Oral BID    levothyroxine  75 mcg Oral Daily    therapeutic multivitamin-minerals  1 tablet Oral Daily    vitamin D3  800 Units Oral Daily    sodium chloride flush  10 mL Intravenous 2 times per day    enoxaparin  40 mg Subcutaneous Daily    hydroxychloroquine  200 mg Oral Daily     Continuous Infusions:  PRN Meds:polyvinyl alcohol, sodium chloride flush, acetaminophen **OR** acetaminophen, polyethylene glycol, promethazine **OR** ondansetron    OBJECTIVE:  /70   Pulse 90   Temp 99.8 °F (37.7 °C) (Oral)   Resp 22   Ht 5' 3\" (1.6 m)   Wt 135 lb (61.2 kg)   SpO2 96%   BMI 23.91 kg/m²   Temp  Av °F (37.2 °C)  Min: 98.5 °F (36.9 °C)  Max: 99.8 °F (37.7 °C)  Constitutional: The patient is awake, alert, and oriented. Skin: Warm and dry. No rashes were noted. HEENT: Round and reactive pupils. Moist mucous membranes. No ulcerations or thrush. Neck: Supple to movements. Chest: No use of accessory muscles to breathe. Symmetrical expansion. No wheezing, crackles or rhonchi. Cardiovascular: S1 and S2 are rhythmic and regular. No murmurs appreciated. Abdomen: Positive bowel sounds to auscultation. Benign to palpation. Extremities: No edema.   Lines: peripheral    Laboratory and Tests Review:  Lab Results   Component Value Date    WBC 8.3 2020    WBC 8.9 2020    HGB 13.3 2020    HCT 39.2 03/20/2020    MCV 85.6 03/20/2020     03/20/2020     Lab Results   Component Value Date    NEUTROABS 7.45 (H) 03/20/2020    NEUTROABS 8.06 (H) 03/19/2020     No results found for: CRPHS  Lab Results   Component Value Date    ALT 35 (H) 03/20/2020    AST 42 (H) 03/20/2020    ALKPHOS 81 03/20/2020    BILITOT 0.3 03/20/2020     Lab Results   Component Value Date     03/20/2020    K 3.5 03/20/2020    CL 98 03/20/2020    CO2 21 03/20/2020    BUN 10 03/20/2020    CREATININE 0.8 03/20/2020    CREATININE 0.8 03/19/2020    GFRAA >60 03/20/2020    LABGLOM >60 03/20/2020    GLUCOSE 115 03/20/2020    PROT 6.5 03/20/2020    LABALBU 3.3 03/20/2020    CALCIUM 8.3 03/20/2020    BILITOT 0.3 03/20/2020    ALKPHOS 81 03/20/2020    AST 42 03/20/2020    ALT 35 03/20/2020     Lab Results   Component Value Date    CRP 27.2 (H) 03/21/2020     Lab Results   Component Value Date    SEDRATE 65 (H) 03/21/2020    SEDRATE 1 03/19/2020     Radiology:      Microbiology:   Blood cultures 3/19/2020 CoNS in 1 of 4 bottles  Respiratory culture: Ordered but not sent  Urine culture 3/20/2020 <10,000 GPO  Insert urine antigens  Respiratory panel: Negative  Rapid influenza: Negative    ASSESSMENT:  · Possible SARS COV 2/COVID19 infection  · Probable viral pneumonia  · Fever associated to the above  · CoNS bacteremia. Contaminant    PLAN:  · Continue oral Azithromycin  · Continue Hydroxychloroquine  · Stop Ceftriaxone  · Check final cultures  · Continue droplet/contact isolation.   Patient is not in a negative pressure room    Chon Rainey  8:00 PM  3/21/2020

## 2020-03-22 NOTE — PROGRESS NOTES
[facility] Progress Note    Admitting Date and Time: 3/19/2020  6:49 PM  Admit Dx: Pneumonia [J18.9]  Pneumonia [J18.9]    Subjective:  Patient is being followed for Pneumonia [J18.9]  Pneumonia [J18.9]   Pt feels better today, earlier she was having worsening SOB, that resolved, she required to be on 9-10 L of O2 after she ambulated, now improved. ROS: denies fever, chills, cp, n/v, HA unless stated above.       azithromycin  500 mg Oral Daily    hydroxychloroquine  200 mg Oral BID    levothyroxine  75 mcg Oral Daily    therapeutic multivitamin-minerals  1 tablet Oral Daily    vitamin D3  800 Units Oral Daily    sodium chloride flush  10 mL Intravenous 2 times per day    enoxaparin  40 mg Subcutaneous Daily     polyvinyl alcohol, 1 drop, PRN  sodium chloride flush, 10 mL, PRN  acetaminophen, 650 mg, Q6H PRN    Or  acetaminophen, 650 mg, Q6H PRN  polyethylene glycol, 17 g, Daily PRN  promethazine, 12.5 mg, Q6H PRN    Or  ondansetron, 4 mg, Q6H PRN         Objective:    BP (!) 108/58 Comment: manual  Pulse 84   Temp 97.9 °F (36.6 °C) (Oral)   Resp 20   Ht 5' 3\" (1.6 m)   Wt 135 lb (61.2 kg)   SpO2 95%   BMI 23.91 kg/m²     General Appearance: alert and oriented to person, place and time and in mild to moderate acute distress  Skin: warm and dry  Head: normocephalic and atraumatic  Eyes: pupils equal, round, and reactive to light, extraocular eye movements intact, conjunctivae normal  Neck: neck supple and non tender without mass   Pulmonary/Chest: Crackles bilaterally- no wheezes, mild respiratory distress  Cardiovascular: normal rate, normal S1 and S2 and no carotid bruits  Abdomen: soft, non-tender, non-distended, normal bowel sounds, no masses or organomegaly  Extremities: no cyanosis, no clubbing and no edema  Neurologic: no cranial nerve deficit and speech normal        Recent Labs     03/19/20 1927 03/20/20  0510    134   K 3.1* 3.5   CL 97* 98   CO2 20* 21*   BUN 9 10   CREATININE 0.8 0.8   GLUCOSE 129* 115*   CALCIUM 8.4* 8.3*       Recent Labs     03/19/20  1927 03/20/20  0510   WBC 8.9 8.3   RBC 4.75 4.58   HGB 13.9 13.3   HCT 40.0 39.2   MCV 84.2 85.6   MCH 29.3 29.0   MCHC 34.8* 33.9   RDW 13.5 13.7    232   MPV 9.1 9.3     Assessment:    Active Problems:    Pneumonia  Resolved Problems:    * No resolved hospital problems. *      Plan:  1. Acute hypoxic respiratory failure, oxygen saturation was below 90% on room air, increasing oxygen requirement, at least patient is requiring 7 L 90% of O2 to maintain oxygen saturation about 95%. Most likely due to pneumonia question bacterial versus viral versus a combination. She is low threshold to be transferred to the ICU for intubation. She is full code  2. Pneumonia, respiratory panel came back positive for staph,  Obtain sputum cultures, consult ID, continue azithro and plaquenil day 4, we will discuss with ID. CT ? Viral pneumonia   3. Viral pneumonia, pending COVID-19 started on plaquenil  3. Bacteremia coags - staph, most likely contaminated, received a dose of vanc, appreciate ID input, stop vanc   4. Hypothyroidism, continue levothyroxine. NOTE: This report was transcribed using voice recognition software. Every effort was made to ensure accuracy; however, inadvertent computerized transcription errors may be present.   Electronically signed by Krista Mattson MD on 3/22/2020 at 12:17 PM

## 2020-03-22 NOTE — PLAN OF CARE
Problem: Falls - Risk of:  Goal: Will remain free from falls  Description: Will remain free from falls  3/22/2020 0017 by Marquita Constantino RN  Outcome: Met This Shift  3/22/2020 0016 by Marquita Constantino RN  Outcome: Met This Shift  Goal: Absence of physical injury  Description: Absence of physical injury  3/22/2020 0017 by Marquita Constantino RN  Outcome: Met This Shift  3/22/2020 0016 by Marquita Constantino RN  Outcome: Met This Shift     Problem: Pain:  Description: Pain management should include both nonpharmacologic and pharmacologic interventions.   Goal: Pain level will decrease  Description: Pain level will decrease  3/22/2020 0017 by Marquita Constantino RN  Outcome: Met This Shift  3/22/2020 0016 by Marquita Constantino RN  Outcome: Met This Shift  Goal: Control of acute pain  Description: Control of acute pain  3/22/2020 0017 by Marquita Constantino RN  Outcome: Met This Shift  3/22/2020 0016 by Marquita Constantino RN  Outcome: Met This Shift  Goal: Control of chronic pain  Description: Control of chronic pain  3/22/2020 0017 by Marquita Constantino RN  Outcome: Met This Shift  3/22/2020 0016 by Marquita Constantino RN  Outcome: Met This Shift     Problem: Physical Regulation:  Goal: Ability to maintain a body temperature in the normal range will improve  Description: Ability to maintain a body temperature in the normal range will improve  3/22/2020 0017 by Marquita Constantino RN  Outcome: Met This Shift  3/22/2020 0016 by Marquita Constantino RN  Outcome: Met This Shift  Goal: Ability to maintain vital signs within normal range will improve  Description: Ability to maintain vital signs within normal range will improve  3/22/2020 0017 by Marquita Constantino RN  Outcome: Met This Shift  3/22/2020 0016 by Marquita Constantino RN  Outcome: Met This Shift     Problem: Gas Exchange - Impaired:  Goal: Levels of oxygenation will improve  Description: Levels of oxygenation will improve  Outcome: Met This Shift     Problem: Breathing Pattern - Ineffective:  Goal: Ability to achieve and maintain a regular respiratory rate will improve  Description: Ability to achieve and maintain a regular respiratory rate will improve  Outcome: Met This Shift

## 2020-03-23 PROCEDURE — 2580000003 HC RX 258: Performed by: INTERNAL MEDICINE

## 2020-03-23 PROCEDURE — 2700000000 HC OXYGEN THERAPY PER DAY

## 2020-03-23 PROCEDURE — 6370000000 HC RX 637 (ALT 250 FOR IP): Performed by: INTERNAL MEDICINE

## 2020-03-23 PROCEDURE — 6370000000 HC RX 637 (ALT 250 FOR IP): Performed by: SPECIALIST

## 2020-03-23 PROCEDURE — 6360000002 HC RX W HCPCS: Performed by: INTERNAL MEDICINE

## 2020-03-23 PROCEDURE — 2060000000 HC ICU INTERMEDIATE R&B

## 2020-03-23 PROCEDURE — 99233 SBSQ HOSP IP/OBS HIGH 50: CPT | Performed by: INTERNAL MEDICINE

## 2020-03-23 RX ADMIN — LEVOTHYROXINE SODIUM 75 MCG: 75 TABLET ORAL at 06:26

## 2020-03-23 RX ADMIN — Medication 10 ML: at 22:01

## 2020-03-23 RX ADMIN — Medication 10 ML: at 08:19

## 2020-03-23 RX ADMIN — ENOXAPARIN SODIUM 40 MG: 40 INJECTION SUBCUTANEOUS at 08:20

## 2020-03-23 RX ADMIN — HYDROXYCHLOROQUINE SULFATE 200 MG: 200 TABLET, FILM COATED ORAL at 22:01

## 2020-03-23 RX ADMIN — ACETAMINOPHEN 650 MG: 325 TABLET ORAL at 18:03

## 2020-03-23 RX ADMIN — CHOLECALCIFEROL TAB 10 MCG (400 UNIT) 800 UNITS: 10 TAB at 08:19

## 2020-03-23 RX ADMIN — AZITHROMYCIN MONOHYDRATE 500 MG: 250 TABLET ORAL at 22:01

## 2020-03-23 RX ADMIN — BENZONATATE 100 MG: 100 CAPSULE ORAL at 00:27

## 2020-03-23 RX ADMIN — HYDROXYCHLOROQUINE SULFATE 200 MG: 200 TABLET, FILM COATED ORAL at 08:19

## 2020-03-23 RX ADMIN — Medication 1 TABLET: at 08:19

## 2020-03-23 ASSESSMENT — PAIN SCALES - GENERAL: PAINLEVEL_OUTOF10: 0

## 2020-03-23 NOTE — PROGRESS NOTES
Patient 88% on 8L high flow. Slowly increased O2 to 15L high flow nasal and patient only 90%. Non-rebreather at 15L applied and patient sating 95%. Respirations from 26/min decreased to 22/min after non-rebreather applied. Dr. Ross Overall notified.

## 2020-03-23 NOTE — PROGRESS NOTES
[address] [place] [address] [facility]  ANGEL  Progress Note    SUBJECTIVE:  Chief Complaint   Patient presents with    Fever     highest of 101.0    Fatigue     fatigue/weakness x 1 week    Cough     occasional dry cough     Oxygen had to be increased because she was more short of breath. No nausea or vomiting. Still having some loose stools. Tolerating medications. Review of systems:  As stated above in the chief complaint, otherwise negative. Medications:  Scheduled Meds:   azithromycin  500 mg Oral Daily    hydroxychloroquine  200 mg Oral BID    levothyroxine  75 mcg Oral Daily    therapeutic multivitamin-minerals  1 tablet Oral Daily    vitamin D3  800 Units Oral Daily    sodium chloride flush  10 mL Intravenous 2 times per day    enoxaparin  40 mg Subcutaneous Daily     Continuous Infusions:  PRN Meds:benzonatate, polyvinyl alcohol, sodium chloride flush, acetaminophen **OR** acetaminophen, polyethylene glycol, promethazine **OR** ondansetron    OBJECTIVE:  /60   Pulse 84   Temp 97.7 °F (36.5 °C) (Oral)   Resp 20   Ht 5' 3\" (1.6 m)   Wt 135 lb (61.2 kg)   SpO2 92%   BMI 23.91 kg/m²   Temp  Av.3 °F (36.8 °C)  Min: 97.7 °F (36.5 °C)  Max: 99.2 °F (37.3 °C)  Constitutional: The patient is awake, alert, and oriented. 8 L nasal cannula. Skin: Warm and dry. No rashes were noted. HEENT: Round and reactive pupils. Moist mucous membranes. No ulcerations or thrush. Neck: Supple to movements. Chest: Crackles on lateral lung fields. Cardiovascular: Heart sounds rhythmic and regular. Abdomen: Positive bowel sounds to auscultation. Benign to palpation. Extremities: No edema.   Lines: peripheral    Laboratory and Tests Review:  Lab Results   Component Value Date    WBC 8.3 2020    WBC 8.9 2020    HGB 13.3 2020    HCT 39.2 2020    MCV 85.6 2020     2020     Lab Results   Component Value Date    NEUTROABS 7.45 (H) 2020

## 2020-03-23 NOTE — PROGRESS NOTES
speech normal        No results for input(s): NA, K, CL, CO2, BUN, CREATININE, GLUCOSE, CALCIUM in the last 72 hours. No results for input(s): WBC, RBC, HGB, HCT, MCV, MCH, MCHC, RDW, PLT, MPV in the last 72 hours. Assessment:    Active Problems:    Pneumonia  Resolved Problems:    * No resolved hospital problems. *      Plan:  1. Acute hypoxic respiratory failure, oxygen saturation was below 90% on room air, increasing oxygen requirement, on 8 L oxygen nasal cannula with oxygen saturation about 95%. Most likely due to pneumonia question bacterial versus viral versus a combination. She is low threshold to be transferred to the ICU for intubation. She is full code  2. Pneumonia, respiratory panel came back positive for staph,  Obtain sputum cultures, consult ID, continue azithro and plaquenil day 5, we will discuss with ID. CT ? Viral pneumonia   3. Viral pneumonia, pending COVID-19 started on plaquenil  3. Bacteremia coags - staph, most likely contaminated, received a dose of vanc, appreciate ID input, stop vanc   4. Hypothyroidism, continue levothyroxine. NOTE: This report was transcribed using voice recognition software. Every effort was made to ensure accuracy; however, inadvertent computerized transcription errors may be present.   Electronically signed by Kyra Courtney MD on 3/23/2020 at 1:24 PM

## 2020-03-23 NOTE — PROGRESS NOTES
Spoke with Dr. Marcellus Valdez regarding pt needing 8L of O2 and desating with movement along with a dry cough. Orders noted.

## 2020-03-23 NOTE — PLAN OF CARE
Problem: Falls - Risk of:  Goal: Will remain free from falls  Description: Will remain free from falls  3/22/2020 2304 by Mitzi Pinto RN  Outcome: Met This Shift     Problem: Falls - Risk of:  Goal: Absence of physical injury  Description: Absence of physical injury  Outcome: Met This Shift     Problem: Pain:  Goal: Pain level will decrease  Description: Pain level will decrease  Outcome: Met This Shift     Problem: Pain:  Goal: Control of acute pain  Description: Control of acute pain  3/22/2020 2304 by Mitzi Pinto RN  Outcome: Met This Shift     Problem: Pain:  Goal: Control of chronic pain  Description: Control of chronic pain  Outcome: Met This Shift     Problem: Physical Regulation:  Goal: Ability to maintain a body temperature in the normal range will improve  Description: Ability to maintain a body temperature in the normal range will improve  Outcome: Met This Shift     Problem: Breathing Pattern - Ineffective:  Goal: Ability to achieve and maintain a regular respiratory rate will improve  Description: Ability to achieve and maintain a regular respiratory rate will improve  Outcome: Met This Shift     Problem: Gas Exchange - Impaired:  Goal: Levels of oxygenation will improve  Description: Levels of oxygenation will improve  Outcome: Not Met This Shift

## 2020-03-24 LAB
ALBUMIN SERPL-MCNC: 2.9 G/DL (ref 3.5–5.2)
ALP BLD-CCNC: 83 U/L (ref 35–104)
ALT SERPL-CCNC: 23 U/L (ref 0–32)
ANION GAP SERPL CALCULATED.3IONS-SCNC: 17 MMOL/L (ref 7–16)
AST SERPL-CCNC: 24 U/L (ref 0–31)
BASOPHILS ABSOLUTE: 0.02 E9/L (ref 0–0.2)
BASOPHILS RELATIVE PERCENT: 0.3 % (ref 0–2)
BILIRUB SERPL-MCNC: 0.5 MG/DL (ref 0–1.2)
BUN BLDV-MCNC: 10 MG/DL (ref 8–23)
C-REACTIVE PROTEIN: 21.2 MG/DL (ref 0–0.4)
CALCIUM SERPL-MCNC: 8.4 MG/DL (ref 8.6–10.2)
CHLORIDE BLD-SCNC: 98 MMOL/L (ref 98–107)
CO2: 24 MMOL/L (ref 22–29)
CREAT SERPL-MCNC: 0.6 MG/DL (ref 0.5–1)
CULTURE, BLOOD 2: NORMAL
EOSINOPHILS ABSOLUTE: 0.09 E9/L (ref 0.05–0.5)
EOSINOPHILS RELATIVE PERCENT: 1.2 % (ref 0–6)
GFR AFRICAN AMERICAN: >60
GFR NON-AFRICAN AMERICAN: >60 ML/MIN/1.73
GLUCOSE BLD-MCNC: 104 MG/DL (ref 74–99)
HCT VFR BLD CALC: 35.5 % (ref 34–48)
HEMOGLOBIN: 11.7 G/DL (ref 11.5–15.5)
IMMATURE GRANULOCYTES #: 0.05 E9/L
IMMATURE GRANULOCYTES %: 0.7 % (ref 0–5)
LYMPHOCYTES ABSOLUTE: 0.78 E9/L (ref 1.5–4)
LYMPHOCYTES RELATIVE PERCENT: 10.8 % (ref 20–42)
MCH RBC QN AUTO: 29 PG (ref 26–35)
MCHC RBC AUTO-ENTMCNC: 33 % (ref 32–34.5)
MCV RBC AUTO: 87.9 FL (ref 80–99.9)
MONOCYTES ABSOLUTE: 0.79 E9/L (ref 0.1–0.95)
MONOCYTES RELATIVE PERCENT: 10.9 % (ref 2–12)
NEUTROPHILS ABSOLUTE: 5.5 E9/L (ref 1.8–7.3)
NEUTROPHILS RELATIVE PERCENT: 76.1 % (ref 43–80)
PDW BLD-RTO: 13.2 FL (ref 11.5–15)
PLATELET # BLD: 338 E9/L (ref 130–450)
PMV BLD AUTO: 8.7 FL (ref 7–12)
POTASSIUM SERPL-SCNC: 3.2 MMOL/L (ref 3.5–5)
PROCALCITONIN: 0.07 NG/ML (ref 0–0.08)
RBC # BLD: 4.04 E12/L (ref 3.5–5.5)
SEDIMENTATION RATE, ERYTHROCYTE: 90 MM/HR (ref 0–20)
SODIUM BLD-SCNC: 139 MMOL/L (ref 132–146)
TOTAL PROTEIN: 6.5 G/DL (ref 6.4–8.3)
WBC # BLD: 7.2 E9/L (ref 4.5–11.5)

## 2020-03-24 PROCEDURE — 86140 C-REACTIVE PROTEIN: CPT

## 2020-03-24 PROCEDURE — 6370000000 HC RX 637 (ALT 250 FOR IP): Performed by: SPECIALIST

## 2020-03-24 PROCEDURE — 85651 RBC SED RATE NONAUTOMATED: CPT

## 2020-03-24 PROCEDURE — 6370000000 HC RX 637 (ALT 250 FOR IP): Performed by: INTERNAL MEDICINE

## 2020-03-24 PROCEDURE — 36415 COLL VENOUS BLD VENIPUNCTURE: CPT

## 2020-03-24 PROCEDURE — 2060000000 HC ICU INTERMEDIATE R&B

## 2020-03-24 PROCEDURE — 99231 SBSQ HOSP IP/OBS SF/LOW 25: CPT | Performed by: INTERNAL MEDICINE

## 2020-03-24 PROCEDURE — 84145 PROCALCITONIN (PCT): CPT

## 2020-03-24 PROCEDURE — 85025 COMPLETE CBC W/AUTO DIFF WBC: CPT

## 2020-03-24 PROCEDURE — 2580000003 HC RX 258: Performed by: INTERNAL MEDICINE

## 2020-03-24 PROCEDURE — 6360000002 HC RX W HCPCS: Performed by: INTERNAL MEDICINE

## 2020-03-24 PROCEDURE — 80053 COMPREHEN METABOLIC PANEL: CPT

## 2020-03-24 PROCEDURE — 2700000000 HC OXYGEN THERAPY PER DAY

## 2020-03-24 RX ORDER — POTASSIUM BICARBONATE 25 MEQ/1
50 TABLET, EFFERVESCENT ORAL ONCE
Status: DISCONTINUED | OUTPATIENT
Start: 2020-03-24 | End: 2020-03-24 | Stop reason: CLARIF

## 2020-03-24 RX ADMIN — Medication 1 TABLET: at 08:50

## 2020-03-24 RX ADMIN — AZITHROMYCIN MONOHYDRATE 500 MG: 250 TABLET ORAL at 21:11

## 2020-03-24 RX ADMIN — LEVOTHYROXINE SODIUM 75 MCG: 75 TABLET ORAL at 05:29

## 2020-03-24 RX ADMIN — ENOXAPARIN SODIUM 40 MG: 40 INJECTION SUBCUTANEOUS at 08:50

## 2020-03-24 RX ADMIN — CHOLECALCIFEROL TAB 10 MCG (400 UNIT) 800 UNITS: 10 TAB at 08:51

## 2020-03-24 RX ADMIN — Medication 10 ML: at 21:11

## 2020-03-24 RX ADMIN — HYDROXYCHLOROQUINE SULFATE 200 MG: 200 TABLET, FILM COATED ORAL at 21:11

## 2020-03-24 RX ADMIN — POTASSIUM BICARBONATE 40 MEQ: 782 TABLET, EFFERVESCENT ORAL at 14:12

## 2020-03-24 RX ADMIN — ACETAMINOPHEN 650 MG: 325 TABLET ORAL at 14:13

## 2020-03-24 RX ADMIN — HYDROXYCHLOROQUINE SULFATE 200 MG: 200 TABLET, FILM COATED ORAL at 08:51

## 2020-03-24 RX ADMIN — Medication 10 ML: at 08:51

## 2020-03-24 ASSESSMENT — PAIN DESCRIPTION - DESCRIPTORS: DESCRIPTORS: DISCOMFORT;CRAMPING;SORE

## 2020-03-24 ASSESSMENT — PAIN DESCRIPTION - ORIENTATION: ORIENTATION: LEFT

## 2020-03-24 ASSESSMENT — PAIN SCALES - GENERAL
PAINLEVEL_OUTOF10: 0

## 2020-03-24 NOTE — PLAN OF CARE
Problem: Falls - Risk of:  Goal: Will remain free from falls  Description: Will remain free from falls  3/24/2020 1115 by Dale Camara RN  Outcome: Met This Shift     Problem: Falls - Risk of:  Goal: Absence of physical injury  Description: Absence of physical injury  3/24/2020 1115 by Dale Camara RN  Outcome: Met This Shift

## 2020-03-24 NOTE — PROGRESS NOTES
[facility] Quality Flow/Interdisciplinary Rounds Progress Note        Quality Flow Rounds held on March 24, 2020    Disciplines Attending:  Bedside Nurse,  and Nursing Unit Leadership    Michael Patterson was admitted on 3/19/2020  6:49 PM    Anticipated Discharge Date:       Disposition:    Alek Score:  Alek Scale Score: 19    Readmission Risk              Risk of Unplanned Readmission:        8           Discussed patient goal for the day, patient clinical progression, and barriers to discharge. The following Goal(s) of the Day/Commitment(s) have been identified:  await covid results, monitor oxygenation.       Thomas Mares  March 24, 2020

## 2020-03-24 NOTE — PROGRESS NOTES
[facility] Progress Note    Admitting Date and Time: 3/19/2020  6:49 PM  Admit Dx: Pneumonia [J18.9]  Pneumonia [J18.9]    Subjective:  Patient is being followed for Pneumonia [J18.9]  Pneumonia [J18.9]   Pt feels better today, she reported no shortness of breath unless she has a coughing fit, her oxygen saturation maintained stable on 6 L. She did have a low-grade fever at 100.4 last night, patient denied shortness of breath transferring to the bedside commode. I did a phone interview with the patient, I did not perform face-to-face review to minimize physical contact especially that the patient is stable. ROS: denies  chills, cp, n/v, HA unless stated above.  azithromycin  500 mg Oral Daily    hydroxychloroquine  200 mg Oral BID    levothyroxine  75 mcg Oral Daily    therapeutic multivitamin-minerals  1 tablet Oral Daily    vitamin D3  800 Units Oral Daily    sodium chloride flush  10 mL Intravenous 2 times per day    enoxaparin  40 mg Subcutaneous Daily     benzonatate, 100 mg, TID PRN  polyvinyl alcohol, 1 drop, PRN  sodium chloride flush, 10 mL, PRN  acetaminophen, 650 mg, Q6H PRN    Or  acetaminophen, 650 mg, Q6H PRN  polyethylene glycol, 17 g, Daily PRN  promethazine, 12.5 mg, Q6H PRN    Or  ondansetron, 4 mg, Q6H PRN         Objective:    /65   Pulse 87   Temp 97.9 °F (36.6 °C) (Oral)   Resp 20   Ht 5' 3\" (1.6 m)   Wt 135 lb (61.2 kg)   SpO2 95%   BMI 23.91 kg/m²         Recent Labs     03/24/20  0515      K 3.2*   CL 98   CO2 24   BUN 10   CREATININE 0.6   GLUCOSE 104*   CALCIUM 8.4*       Recent Labs     03/24/20  0515   WBC 7.2   RBC 4.04   HGB 11.7   HCT 35.5   MCV 87.9   MCH 29.0   MCHC 33.0   RDW 13.2      MPV 8.7     Assessment:    Active Problems:    Pneumonia  Resolved Problems:    * No resolved hospital problems. *      Plan:  1.   Acute hypoxic respiratory failure, patient is doing much better today she is maintaining good oxygen

## 2020-03-24 NOTE — ADT AUTH CERT
cannula with oxygen saturation about 95%.  Most likely due to pneumonia question bacterial versus viral versus a combination.  She is low threshold to be transferred to the ICU for intubation.  She is full code   2.  Pneumonia, respiratory panel came back positive for staph,  Obtain sputum cultures, consult ID, continue azithro and plaquenil day 5, we will discuss with ID. CT ? Viral pneumonia    3.  Viral pneumonia, pending COVID-19 started on plaquenil   3.  Bacteremia coags - staph, most likely contaminated, received a dose of vanc, appreciate ID input, stop vanc    4.  Hypothyroidism, continue levothyroxine.          Pneumonia - Care Day 3 (3/21/2020) by Kelley Zamorano RN         Review Status Review Entered   Completed 3/23/2020 12:51       Criteria Review      Care Day: 3 Care Date: 3/21/2020 Level of Care: Telemetry    Guideline Day 2    Clinical Status    ( ) * No CO2 retention or acidosis    (X) * No requirement for mechanical ventilation    ( ) * Hypotension absent    ( ) * Fever absent or reduced    ( ) * No hypoxia on room air or oxygenation improved    ( ) * Mental status improved or at baseline    Activity    (X) * Increased activity    Routes    (X) Oral hydration, medications    (X) Usual diet    Interventions    (X) Incentive spirometry    (X) Pulse oximetry    (X) Head of bed at 30 degrees    (X) Possible oxygen    Medications    (X) IV or oral antibiotics    * Milestone   Additional Notes    03/21/20 0756  98.5 (36.9)  22  90  128/70  -  -  -  6  89Abnormal  Nasal cannula        03/21/20 1331  99.8 (37.7)       Scheduled Medications   · hydroxychloroquine 200 mg Oral BID   · levothyroxine 75 mcg Oral Daily   · therapeutic multivitamin-minerals 1 tablet Oral Daily   · vitamin D3 800 Units Oral Daily   · sodium chloride flush 10 mL Intravenous 2 times per day   · enoxaparin 40 mg Subcutaneous Daily   · hydroxychloroquine 200 mg Oral Daily      PRN Medications    sodium chloride flush, 10 mL, PRN acetaminophen, 650 mg, Q6H PRN x1   polyethylene glycol, 17 g, Daily PRN   promethazine, 12.5 mg, Q6H PRN     Or   ondansetron, 4 mg, Q6H PRN             IM:   Subjective:   Patient is being followed for Pneumonia [J18.9]   Pneumonia [J18.9]    Pt feels worse today, breathing more heavily and became more hypoxic      Assessment:    Active Problems:     Pneumonia      Plan:   1.  Acute hypoxic respiratory failure, oxygen saturation was below 90% on room air, increasing oxygen requirement, at least patient is requiring 6-7 L 90% of O2 to maintain oxygen saturation about 93 to 96%.  Most likely due to pneumonia question bacterial versus viral versus a combination.  She is low threshold to be transferred to the ICU for intubation.  She is full code   2.  Pneumonia, respiratory panel came back positive for staph,  Obtain sputum cultures, consult ID, continue on ceftriaxone and azithro, patient was started on plaquenil, we will discuss with ID. CT ? Viral pneumonia    3. Viral pneumonia, pending COVID-19 started on plaquenil   3.  Bacteremia G+C in cultsters, rule out MRSA, will give a dose of vanc and consult ID.  Most likely the source is the lungs, ID started the atpeitne on ceftriaxone and    4.  Hypothyroidism, continue levothyroxine.          ID:   ASSESSMENT:   · Possible SARS COV 2/COVID19 infection   · Probable viral pneumonia   · Fever associated to the above   · CoNS bacteremia.  Contaminant       PLAN:   · Continue oral Azithromycin   · Continue Hydroxychloroquine   · Stop Ceftriaxone   · Check final cultures   · Continue droplet/contact isolation.  Patient is not in a negative pressure room

## 2020-03-24 NOTE — PROGRESS NOTES
[address] [place] [address] [facility]  ANGEL  Progress Note    SUBJECTIVE:  Chief Complaint   Patient presents with    Fever     highest of 101.0    Fatigue     fatigue/weakness x 1 week    Cough     occasional dry cough     Patient is still having some loose stools. She feels somewhat better today. Recovering appetite and eating. Tolerating medications. Review of systems:  As stated above in the chief complaint, otherwise negative. Medications:  Scheduled Meds:   azithromycin  500 mg Oral Daily    hydroxychloroquine  200 mg Oral BID    levothyroxine  75 mcg Oral Daily    therapeutic multivitamin-minerals  1 tablet Oral Daily    vitamin D3  800 Units Oral Daily    sodium chloride flush  10 mL Intravenous 2 times per day    enoxaparin  40 mg Subcutaneous Daily     Continuous Infusions:  PRN Meds:benzonatate, polyvinyl alcohol, sodium chloride flush, acetaminophen **OR** acetaminophen, polyethylene glycol, promethazine **OR** ondansetron    OBJECTIVE:  BP (!) 118/50   Pulse 81   Temp 97.7 °F (36.5 °C) (Oral)   Resp 20   Ht 5' 3\" (1.6 m)   Wt 135 lb (61.2 kg)   SpO2 93%   BMI 23.91 kg/m²   Temp  Av.3 °F (36.8 °C)  Min: 97.7 °F (36.5 °C)  Max: 98.8 °F (37.1 °C)  Constitutional: The patient is awake, alert, and oriented. Down to 6 L nasal cannula. Skin: Warm and dry. No rashes were noted. HEENT: Round and reactive pupils. Moist mucous membranes. No ulcerations or thrush. Neck: Supple to movements. Chest: Crackles on lateral lung fields. Cardiovascular: Heart sounds rhythmic and regular. Abdomen: Positive bowel sounds to auscultation. Benign to palpation. Extremities: No edema.   Lines: peripheral    Laboratory and Tests Review:  Lab Results   Component Value Date    WBC 7.2 2020    WBC 8.3 2020    WBC 8.9 2020    HGB 11.7 2020    HCT 35.5 2020    MCV 87.9 2020     2020     Lab Results   Component Value Date    NEUTROABS 5.50 03/24/2020    NEUTROABS 7.45 (H) 03/20/2020    NEUTROABS 8.06 (H) 03/19/2020     No results found for: CRPHS  Lab Results   Component Value Date    ALT 23 03/24/2020    AST 24 03/24/2020    ALKPHOS 83 03/24/2020    BILITOT 0.5 03/24/2020     Lab Results   Component Value Date     03/24/2020    K 3.2 03/24/2020    CL 98 03/24/2020    CO2 24 03/24/2020    BUN 10 03/24/2020    CREATININE 0.6 03/24/2020    CREATININE 0.8 03/20/2020    CREATININE 0.8 03/19/2020    GFRAA >60 03/24/2020    LABGLOM >60 03/24/2020    GLUCOSE 104 03/24/2020    PROT 6.5 03/24/2020    LABALBU 2.9 03/24/2020    CALCIUM 8.4 03/24/2020    BILITOT 0.5 03/24/2020    ALKPHOS 83 03/24/2020    AST 24 03/24/2020    ALT 23 03/24/2020     Lab Results   Component Value Date    CRP 21.2 (H) 03/24/2020    CRP 27.2 (H) 03/21/2020     Lab Results   Component Value Date    SEDRATE 90 (H) 03/24/2020    SEDRATE 65 (H) 03/21/2020    SEDRATE 1 03/19/2020     Radiology:      Microbiology:   Marcella Plenty NP swab by PCR: Pending  Blood cultures 3/19/2020 CoNS in 1 of 4 bottles  Respiratory culture: Ordered but not sent  Urine culture 3/20/2020 <10,000 GPO  Insert urine antigens  Respiratory panel: Negative  Rapid influenza: Negative  Procalcitonin: 0.13    ASSESSMENT:  · Possible SARS COV 2/COVID19 infection  · Probable viral pneumonia  · Fever associated to the above, improving  · CoNS bacteremia. Contaminant    PLAN:  · Continue oral Azithromycin  · Continue Hydroxychloroquine  · Check final cultures  · Continue droplet/contact isolation.   Patient is not in a negative pressure room    Prabhjot Murillo  7:31 PM  3/24/2020

## 2020-03-24 NOTE — PLAN OF CARE
Problem: Falls - Risk of:  Goal: Will remain free from falls  Description: Will remain free from falls  Outcome: Met This Shift  Goal: Absence of physical injury  Description: Absence of physical injury  Outcome: Met This Shift     Problem: Pain:  Goal: Pain level will decrease  Description: Pain level will decrease  Outcome: Met This Shift  Goal: Control of acute pain  Description: Control of acute pain  Outcome: Met This Shift  Goal: Control of chronic pain  Description: Control of chronic pain  Outcome: Met This Shift     Problem: Physical Regulation:  Goal: Ability to maintain a body temperature in the normal range will improve  Description: Ability to maintain a body temperature in the normal range will improve  Outcome: Met This Shift  Goal: Ability to maintain vital signs within normal range will improve  Description: Ability to maintain vital signs within normal range will improve  Outcome: Met This Shift     Problem: Gas Exchange - Impaired:  Goal: Levels of oxygenation will improve  Description: Levels of oxygenation will improve  Outcome: Met This Shift     Problem: Breathing Pattern - Ineffective:  Goal: Ability to achieve and maintain a regular respiratory rate will improve  Description: Ability to achieve and maintain a regular respiratory rate will improve  Outcome: Met This Shift

## 2020-03-24 NOTE — CARE COORDINATION
SS NOTE: SW attempted contact with pt's son Anabelle Bah today and left a detailed vm for him. Pt currenly on high flow O2 and unable to speak on the phone. SW will continue efforts to update pt's dch planning. Darion Wyatt. 3/24/2020.10:50 AM.

## 2020-03-25 PROCEDURE — 99232 SBSQ HOSP IP/OBS MODERATE 35: CPT | Performed by: INTERNAL MEDICINE

## 2020-03-25 PROCEDURE — 6370000000 HC RX 637 (ALT 250 FOR IP): Performed by: INTERNAL MEDICINE

## 2020-03-25 PROCEDURE — 2580000003 HC RX 258: Performed by: INTERNAL MEDICINE

## 2020-03-25 PROCEDURE — 2060000000 HC ICU INTERMEDIATE R&B

## 2020-03-25 PROCEDURE — 2700000000 HC OXYGEN THERAPY PER DAY

## 2020-03-25 PROCEDURE — 6360000002 HC RX W HCPCS: Performed by: INTERNAL MEDICINE

## 2020-03-25 PROCEDURE — 6370000000 HC RX 637 (ALT 250 FOR IP): Performed by: SPECIALIST

## 2020-03-25 RX ORDER — RIBAVIRIN 200 MG/1
2400 CAPSULE ORAL ONCE
Status: COMPLETED | OUTPATIENT
Start: 2020-03-25 | End: 2020-03-25

## 2020-03-25 RX ORDER — RIBAVIRIN 200 MG/1
1200 CAPSULE ORAL EVERY 8 HOURS
Status: DISCONTINUED | OUTPATIENT
Start: 2020-03-26 | End: 2020-04-03

## 2020-03-25 RX ADMIN — CHOLECALCIFEROL TAB 10 MCG (400 UNIT) 800 UNITS: 10 TAB at 08:34

## 2020-03-25 RX ADMIN — AZITHROMYCIN MONOHYDRATE 500 MG: 250 TABLET ORAL at 21:25

## 2020-03-25 RX ADMIN — HYDROXYCHLOROQUINE SULFATE 200 MG: 200 TABLET, FILM COATED ORAL at 08:34

## 2020-03-25 RX ADMIN — HYDROXYCHLOROQUINE SULFATE 200 MG: 200 TABLET, FILM COATED ORAL at 21:25

## 2020-03-25 RX ADMIN — DARUNAVIR ETHANOLATE AND COBICISTAT 1 EACH: 800; 150 TABLET, FILM COATED ORAL at 21:25

## 2020-03-25 RX ADMIN — ENOXAPARIN SODIUM 40 MG: 40 INJECTION SUBCUTANEOUS at 08:34

## 2020-03-25 RX ADMIN — LEVOTHYROXINE SODIUM 75 MCG: 75 TABLET ORAL at 06:48

## 2020-03-25 RX ADMIN — RIBAVIRIN 2400 MG: 200 CAPSULE ORAL at 21:25

## 2020-03-25 RX ADMIN — ACETAMINOPHEN 650 MG: 325 TABLET ORAL at 18:03

## 2020-03-25 RX ADMIN — Medication 10 ML: at 21:26

## 2020-03-25 RX ADMIN — Medication 1 TABLET: at 08:34

## 2020-03-25 RX ADMIN — Medication 10 ML: at 08:34

## 2020-03-25 ASSESSMENT — PAIN SCALES - GENERAL
PAINLEVEL_OUTOF10: 0

## 2020-03-25 NOTE — PROGRESS NOTES
[address] [place] [address] [facility]  ANGEL  Progress Note    SUBJECTIVE:  Chief Complaint   Patient presents with    Fever     highest of 101.0    Fatigue     fatigue/weakness x 1 week    Cough     occasional dry cough     No new complaints today. She is still somewhat dyspneic. Still short of breath. Still having some loose stools. No fever. Review of systems:  As stated above in the chief complaint, otherwise negative. Medications:  Scheduled Meds:   azithromycin  500 mg Oral Daily    hydroxychloroquine  200 mg Oral BID    levothyroxine  75 mcg Oral Daily    therapeutic multivitamin-minerals  1 tablet Oral Daily    vitamin D3  800 Units Oral Daily    sodium chloride flush  10 mL Intravenous 2 times per day    enoxaparin  40 mg Subcutaneous Daily     Continuous Infusions:  PRN Meds:benzonatate, polyvinyl alcohol, sodium chloride flush, acetaminophen **OR** acetaminophen, polyethylene glycol, promethazine **OR** ondansetron    OBJECTIVE:  BP (!) 106/58   Pulse 81   Temp 98.4 °F (36.9 °C) (Oral)   Resp 18   Ht 5' 3\" (1.6 m)   Wt 135 lb (61.2 kg)   SpO2 92%   BMI 23.91 kg/m²   Temp  Av.1 °F (36.7 °C)  Min: 97.7 °F (36.5 °C)  Max: 98.4 °F (36.9 °C)  Constitutional: The patient is awake, alert, and oriented. He is still at 6 L nasal cannula. Skin: Warm and dry. No rashes were noted. HEENT: Round and reactive pupils. Moist mucous membranes. No ulcerations or thrush. Neck: Supple to movements. Chest: Crackles on lateral lung fields. Cardiovascular: Heart sounds rhythmic and regular. Abdomen: Positive bowel sounds to auscultation. Benign to palpation. Extremities: No edema.   Lines: peripheral    Laboratory and Tests Review:  Lab Results   Component Value Date    WBC 7.2 2020    WBC 8.3 2020    WBC 8.9 2020    HGB 11.7 2020    HCT 35.5 2020    MCV 87.9 2020     2020     Lab Results   Component Value Date    NEUTROABS 5.50 03/24/2020    NEUTROABS 7.45 (H) 03/20/2020    NEUTROABS 8.06 (H) 03/19/2020     No results found for: CRPHS  Lab Results   Component Value Date    ALT 23 03/24/2020    AST 24 03/24/2020    ALKPHOS 83 03/24/2020    BILITOT 0.5 03/24/2020     Lab Results   Component Value Date     03/24/2020    K 3.2 03/24/2020    CL 98 03/24/2020    CO2 24 03/24/2020    BUN 10 03/24/2020    CREATININE 0.6 03/24/2020    CREATININE 0.8 03/20/2020    CREATININE 0.8 03/19/2020    GFRAA >60 03/24/2020    LABGLOM >60 03/24/2020    GLUCOSE 104 03/24/2020    PROT 6.5 03/24/2020    LABALBU 2.9 03/24/2020    CALCIUM 8.4 03/24/2020    BILITOT 0.5 03/24/2020    ALKPHOS 83 03/24/2020    AST 24 03/24/2020    ALT 23 03/24/2020     Lab Results   Component Value Date    CRP 21.2 (H) 03/24/2020    CRP 27.2 (H) 03/21/2020     Lab Results   Component Value Date    SEDRATE 90 (H) 03/24/2020    SEDRATE 65 (H) 03/21/2020    SEDRATE 1 03/19/2020     Radiology:      Microbiology:   [address] NP swab by PCR: Pending  Blood cultures 3/19/2020 CoNS in 1 of 4 bottles  Respiratory culture: Ordered but not sent  Urine culture 3/20/2020 <10,000 GPO  Insert urine antigens  Respiratory panel: Negative  Rapid influenza: Negative  Procalcitonin: 0.13    ASSESSMENT:  · Possible SARS COV 2/COVID19 infection  · Probable viral pneumonia  · Fever associated to the above, improving  · CoNS bacteremia. Contaminant    PLAN:  · Continue oral Azithromycin  · Continue Hydroxychloroquine  · Add protease inhibitor Prezcobix (Darunavir plus cobicistat) and Ribavirin  · Continue droplet/contact isolation.   Patient is not in a negative pressure room    Isi Rowleya  6:05 PM  3/25/2020

## 2020-03-25 NOTE — CARE COORDINATION
3/25/2020 CM note; tested for covid 19 results are still pending. Pt is on 6 L [place] oxygen will need O2 testing prior discharge. She remains on plaquenil/ATB azithromax oral. May need PT/OT evaluation prior to discharge. She lives alone with family support.  PS

## 2020-03-25 NOTE — PROGRESS NOTES
[facility] Progress Note    Admitting Date and Time: 3/19/2020  6:49 PM  Admit Dx: Pneumonia [J18.9]  Pneumonia [J18.9]    Subjective:  Patient is being followed for Pneumonia [J18.9]  Pneumonia [J18.9]   Pt feels okay today, still with shortness of breath on ambulation and with some cough, oxygen saturation stable at 6 L, no fever for over 24 hours    I did a phone interview with the patient, I did not perform face-to-face review to minimize physical contact especially that the patient is stable. ROS: denies  chills, cp, n/v, HA unless stated above.  azithromycin  500 mg Oral Daily    hydroxychloroquine  200 mg Oral BID    levothyroxine  75 mcg Oral Daily    therapeutic multivitamin-minerals  1 tablet Oral Daily    vitamin D3  800 Units Oral Daily    sodium chloride flush  10 mL Intravenous 2 times per day    enoxaparin  40 mg Subcutaneous Daily     benzonatate, 100 mg, TID PRN  polyvinyl alcohol, 1 drop, PRN  sodium chloride flush, 10 mL, PRN  acetaminophen, 650 mg, Q6H PRN    Or  acetaminophen, 650 mg, Q6H PRN  polyethylene glycol, 17 g, Daily PRN  promethazine, 12.5 mg, Q6H PRN    Or  ondansetron, 4 mg, Q6H PRN         Objective:    BP (!) 106/58   Pulse 81   Temp 98.4 °F (36.9 °C) (Oral)   Resp 18   Ht 5' 3\" (1.6 m)   Wt 135 lb (61.2 kg)   SpO2 92%   BMI 23.91 kg/m²         Recent Labs     03/24/20  0515      K 3.2*   CL 98   CO2 24   BUN 10   CREATININE 0.6   GLUCOSE 104*   CALCIUM 8.4*       Recent Labs     03/24/20  0515   WBC 7.2   RBC 4.04   HGB 11.7   HCT 35.5   MCV 87.9   MCH 29.0   MCHC 33.0   RDW 13.2      MPV 8.7     Assessment:    Active Problems:    Pneumonia  Resolved Problems:    * No resolved hospital problems. *      Plan:  1. Acute hypoxic respiratory failure, patient is doing much better today she is maintaining good oxygen saturation around 95% on 6 L. .  She is full code  2.   Acute viral pneumonia, respiratory panel came back positive for staph,  Obtain sputum cultures, consult ID, continue azithro and plaquenil day 7,   3. Viral pneumonia, pending COVID-19 started on plaquenil and azithro  3. Bacteremia coags - staph, most likely contaminated, received a dose of vanc, appreciate ID input, stop vanc   4. Hypothyroidism, continue levothyroxine. NOTE: This report was transcribed using voice recognition software. Every effort was made to ensure accuracy; however, inadvertent computerized transcription errors may be present.   Electronically signed by Karsten Ayala MD on 3/25/2020 at 6:15 PM

## 2020-03-25 NOTE — PLAN OF CARE
Problem: Falls - Risk of:  Goal: Will remain free from falls  Description: Will remain free from falls  3/25/2020 1150 by Bre Good RN  Outcome: Met This Shift     Problem: Falls - Risk of:  Goal: Absence of physical injury  Description: Absence of physical injury  3/25/2020 1150 by Bre Good RN  Outcome: Met This Shift

## 2020-03-26 LAB
BLOOD CULTURE, ROUTINE: ABNORMAL
ORGANISM: ABNORMAL

## 2020-03-26 PROCEDURE — 2580000003 HC RX 258: Performed by: INTERNAL MEDICINE

## 2020-03-26 PROCEDURE — 6360000002 HC RX W HCPCS: Performed by: INTERNAL MEDICINE

## 2020-03-26 PROCEDURE — 6370000000 HC RX 637 (ALT 250 FOR IP): Performed by: INTERNAL MEDICINE

## 2020-03-26 PROCEDURE — 2060000000 HC ICU INTERMEDIATE R&B

## 2020-03-26 PROCEDURE — 2700000000 HC OXYGEN THERAPY PER DAY

## 2020-03-26 PROCEDURE — 99232 SBSQ HOSP IP/OBS MODERATE 35: CPT | Performed by: INTERNAL MEDICINE

## 2020-03-26 PROCEDURE — 6370000000 HC RX 637 (ALT 250 FOR IP): Performed by: SPECIALIST

## 2020-03-26 RX ADMIN — HYDROXYCHLOROQUINE SULFATE 200 MG: 200 TABLET, FILM COATED ORAL at 08:14

## 2020-03-26 RX ADMIN — HYDROXYCHLOROQUINE SULFATE 200 MG: 200 TABLET, FILM COATED ORAL at 21:00

## 2020-03-26 RX ADMIN — RIBAVIRIN 1200 MG: 200 CAPSULE ORAL at 11:19

## 2020-03-26 RX ADMIN — Medication 10 ML: at 08:15

## 2020-03-26 RX ADMIN — Medication 1 TABLET: at 11:19

## 2020-03-26 RX ADMIN — ENOXAPARIN SODIUM 40 MG: 40 INJECTION SUBCUTANEOUS at 08:14

## 2020-03-26 RX ADMIN — AZITHROMYCIN MONOHYDRATE 500 MG: 250 TABLET ORAL at 21:00

## 2020-03-26 RX ADMIN — CHOLECALCIFEROL TAB 10 MCG (400 UNIT) 800 UNITS: 10 TAB at 08:14

## 2020-03-26 RX ADMIN — DARUNAVIR ETHANOLATE AND COBICISTAT 1 EACH: 800; 150 TABLET, FILM COATED ORAL at 21:00

## 2020-03-26 RX ADMIN — LEVOTHYROXINE SODIUM 75 MCG: 75 TABLET ORAL at 05:38

## 2020-03-26 RX ADMIN — RIBAVIRIN 1200 MG: 200 CAPSULE ORAL at 04:28

## 2020-03-26 RX ADMIN — Medication 10 ML: at 22:55

## 2020-03-26 RX ADMIN — RIBAVIRIN 1200 MG: 200 CAPSULE ORAL at 21:00

## 2020-03-26 ASSESSMENT — PAIN SCALES - GENERAL
PAINLEVEL_OUTOF10: 0

## 2020-03-26 NOTE — PROGRESS NOTES
Type and Reason for Visit: Initial, RD Nutrition Re-Screen(LOS Assessment, RD Re-Screen Negative)    Nutrition Screen:   · Have you recently lost weight without trying? - 0 to 1 pound (0 points)   · Have you been eating poorly because of a decreased appetite? - No (0 points)   · Malnutrition Screening Tool Score - 0    Dietitian Assessment of Nutrition Re-Screen: Pt assessed per LOS protocol. Chart reviewed. Pt currently eating ~% of most meals (per pt via phone) and w/ no other significant nutritional issues noted at this time. Will follow-up per policy. Please consult if RD needed.         Electronically signed by Gary Kidney, RD, LD on 3/26/20 at 3:24 PM EDT    Contact Number: ext [phone]

## 2020-03-26 NOTE — PROGRESS NOTES
[facility] Progress Note    Admitting Date and Time: 3/19/2020  6:49 PM  Admit Dx: Pneumonia [J18.9]  Pneumonia [J18.9]    Subjective:  Patient is being followed for Pneumonia [J18.9]  Pneumonia [J18.9]   Pt feels tired and exhausted, still having cough and shortness of breath, no fever for over 48 hours, O2 sat 92% on 5L    ROS: denies  chills, cp, n/v, HA unless stated above.       darunavir-cobicistat  1 tablet Oral Daily    ribavirin  1,200 mg Oral Q8H    azithromycin  500 mg Oral Daily    hydroxychloroquine  200 mg Oral BID    levothyroxine  75 mcg Oral Daily    therapeutic multivitamin-minerals  1 tablet Oral Daily    vitamin D3  800 Units Oral Daily    sodium chloride flush  10 mL Intravenous 2 times per day    enoxaparin  40 mg Subcutaneous Daily     benzonatate, 100 mg, TID PRN  polyvinyl alcohol, 1 drop, PRN  sodium chloride flush, 10 mL, PRN  acetaminophen, 650 mg, Q6H PRN    Or  acetaminophen, 650 mg, Q6H PRN  polyethylene glycol, 17 g, Daily PRN  promethazine, 12.5 mg, Q6H PRN    Or  ondansetron, 4 mg, Q6H PRN         Objective:    /62   Pulse 92   Temp 97.8 °F (36.6 °C) (Oral)   Resp 20   Ht 5' 3\" (1.6 m)   Wt 135 lb (61.2 kg)   SpO2 92%   BMI 23.91 kg/m²     General Appearance: alert and oriented to person, place and time and in mild to moderate acute distress  Skin: warm and dry  Head: normocephalic and atraumatic  Eyes: pupils equal, round, and reactive to light, extraocular eye movements intact, conjunctivae normal  Neck: neck supple and non tender without mass   Pulmonary/Chest: Crackles bilaterally- no wheezes, mild respiratory distress  Cardiovascular: normal rate, normal S1 and S2 and no carotid bruits  Abdomen: soft, non-tender, non-distended, normal bowel sounds, no masses or organomegaly  Extremities: no cyanosis, no clubbing and no edema  Neurologic: no cranial nerve deficit and speech normal    Recent Labs     03/24/20  0515      K 3.2*   CL 98   CO2 24   BUN 10   CREATININE 0.6   GLUCOSE 104*   CALCIUM 8.4*       Recent Labs     03/24/20  0515   WBC 7.2   RBC 4.04   HGB 11.7   HCT 35.5   MCV 87.9   MCH 29.0   MCHC 33.0   RDW 13.2      MPV 8.7     Assessment:    Active Problems:    Pneumonia  Resolved Problems:    * No resolved hospital problems. *      Plan:  1. Acute hypoxic respiratory failure, patient is doing much better today she is maintaining good oxygen saturation around 92% on 5 L. .  She is full code  2. Acute viral pneumonia, respiratory panel came back positive for staph,  Obtain sputum cultures, consult ID, continue azithro and plaquenil day 7,   3. Viral pneumonia, pending COVID-19 started on plaquenil and azithro  3. Bacteremia coags - staph, most likely contaminated, received a dose of vanc, appreciate ID input, stop vanc   4. Hypothyroidism, continue levothyroxine. NOTE: This report was transcribed using voice recognition software. Every effort was made to ensure accuracy; however, inadvertent computerized transcription errors may be present.   Electronically signed by Darryn Rodríguez MD on 3/26/2020 at 12:43 PM

## 2020-03-26 NOTE — PLAN OF CARE
Problem: Falls - Risk of:  Goal: Will remain free from falls  Description: Will remain free from falls  3/26/2020 1013 by Raj Wayne RN  Outcome: Met This Shift     Problem: Falls - Risk of:  Goal: Absence of physical injury  Description: Absence of physical injury  3/26/2020 1013 by Raj Wayne RN  Outcome: Met This Shift

## 2020-03-26 NOTE — PLAN OF CARE
Problem: Falls - Risk of:  Goal: Will remain free from falls  Description: Will remain free from falls  3/26/2020 0109 by Deepak Neri RN  Outcome: Met This Shift  3/25/2020 1150 by Bre Good RN  Outcome: Met This Shift  Goal: Absence of physical injury  Description: Absence of physical injury  3/26/2020 0109 by Deepak Neri RN  Outcome: Met This Shift  3/25/2020 1150 by Bre Good RN  Outcome: Met This Shift     Problem: Pain:  Goal: Pain level will decrease  Description: Pain level will decrease  Outcome: Met This Shift  Goal: Control of acute pain  Description: Control of acute pain  Outcome: Met This Shift

## 2020-03-26 NOTE — PROGRESS NOTES
[address] [place] [address] [facility]  AMIEIDA  Progress Note    SUBJECTIVE:  Chief Complaint   Patient presents with    Fever     highest of 101.0    Fatigue     fatigue/weakness x 1 week    Cough     occasional dry cough     No new complaints today. She is still somewhat dyspneic. Still short of breath. Still having some loose stools. No fever. Review of systems:  As stated above in the chief complaint, otherwise negative. Medications:  Scheduled Meds:   darunavir-cobicistat  1 tablet Oral Daily    ribavirin  1,200 mg Oral Q8H    azithromycin  500 mg Oral Daily    hydroxychloroquine  200 mg Oral BID    levothyroxine  75 mcg Oral Daily    therapeutic multivitamin-minerals  1 tablet Oral Daily    vitamin D3  800 Units Oral Daily    sodium chloride flush  10 mL Intravenous 2 times per day    enoxaparin  40 mg Subcutaneous Daily     Continuous Infusions:  PRN Meds:benzonatate, polyvinyl alcohol, sodium chloride flush, acetaminophen **OR** acetaminophen, polyethylene glycol, promethazine **OR** ondansetron    OBJECTIVE:  BP (!) 110/58   Pulse 98   Temp 98.2 °F (36.8 °C) (Oral)   Resp 20   Ht 5' 3\" (1.6 m)   Wt 135 lb (61.2 kg)   SpO2 92%   BMI 23.91 kg/m²   Temp  Av °F (36.7 °C)  Min: 97.6 °F (36.4 °C)  Max: 98.5 °F (36.9 °C)  Constitutional: The patient is awake, alert, and oriented. He is still at 6 L nasal cannula. Skin: Warm and dry. No rashes were noted. HEENT: Round and reactive pupils. Moist mucous membranes. No ulcerations or thrush. Neck: Supple to movements. Chest: Crackles on lateral lung fields. Cardiovascular: Heart sounds rhythmic and regular. Abdomen: Positive bowel sounds to auscultation. Benign to palpation. Extremities: No edema.   Lines: peripheral    Laboratory and Tests Review:  Lab Results   Component Value Date    WBC 7.2 2020    WBC 8.3 2020    WBC 8.9 2020    HGB 11.7 2020    HCT 35.5 2020    MCV 87.9 2020  03/24/2020     Lab Results   Component Value Date    NEUTROABS 5.50 03/24/2020    NEUTROABS 7.45 (H) 03/20/2020    NEUTROABS 8.06 (H) 03/19/2020     No results found for: CRP  Lab Results   Component Value Date    ALT 23 03/24/2020    AST 24 03/24/2020    ALKPHOS 83 03/24/2020    BILITOT 0.5 03/24/2020     Lab Results   Component Value Date     03/24/2020    K 3.2 03/24/2020    CL 98 03/24/2020    CO2 24 03/24/2020    BUN 10 03/24/2020    CREATININE 0.6 03/24/2020    CREATININE 0.8 03/20/2020    CREATININE 0.8 03/19/2020    GFRAA >60 03/24/2020    LABGLOM >60 03/24/2020    GLUCOSE 104 03/24/2020    PROT 6.5 03/24/2020    LABALBU 2.9 03/24/2020    CALCIUM 8.4 03/24/2020    BILITOT 0.5 03/24/2020    ALKPHOS 83 03/24/2020    AST 24 03/24/2020    ALT 23 03/24/2020     Lab Results   Component Value Date    CRP 21.2 (H) 03/24/2020    CRP 27.2 (H) 03/21/2020     Lab Results   Component Value Date    SEDRATE 90 (H) 03/24/2020    SEDRATE 65 (H) 03/21/2020    SEDRATE 1 03/19/2020     Radiology:      Microbiology:   [address] NP swab by PCR: Pending  Blood cultures 3/19/2020 CoNS in 1 of 4 bottles  Respiratory culture: Ordered but not sent  Urine culture 3/20/2020 <10,000 GPO  Insert urine antigens  Respiratory panel: Negative  Rapid influenza: Negative  Procalcitonin: 0.13    ASSESSMENT:  · Possible SARS COV 2/COVID19 infection  · Probable viral pneumonia. Stable O2 requirements have not increased  · Fever associated to the above, improving  · CoNS bacteremia. Contaminant    PLAN:  · Continue oral Azithromycin  · Continue Hydroxychloroquine  · Continue protease inhibitor Prezcobix (Darunavir plus cobicistat) and Ribavirin  · Continue droplet/contact isolation.   Patient is not in a negative pressure room    Chon Rainey  5:54 PM  3/26/2020

## 2020-03-27 LAB
ANION GAP SERPL CALCULATED.3IONS-SCNC: 15 MMOL/L (ref 7–16)
BASOPHILS ABSOLUTE: 0.02 E9/L (ref 0–0.2)
BASOPHILS RELATIVE PERCENT: 0.2 % (ref 0–2)
BUN BLDV-MCNC: 9 MG/DL (ref 8–23)
CALCIUM SERPL-MCNC: 8.7 MG/DL (ref 8.6–10.2)
CHLORIDE BLD-SCNC: 94 MMOL/L (ref 98–107)
CO2: 24 MMOL/L (ref 22–29)
CREAT SERPL-MCNC: 0.8 MG/DL (ref 0.5–1)
EOSINOPHILS ABSOLUTE: 0.05 E9/L (ref 0.05–0.5)
EOSINOPHILS RELATIVE PERCENT: 0.5 % (ref 0–6)
GFR AFRICAN AMERICAN: >60
GFR NON-AFRICAN AMERICAN: >60 ML/MIN/1.73
GLUCOSE BLD-MCNC: 105 MG/DL (ref 74–99)
HCT VFR BLD CALC: 36.7 % (ref 34–48)
HEMOGLOBIN: 12 G/DL (ref 11.5–15.5)
IMMATURE GRANULOCYTES #: 0.06 E9/L
IMMATURE GRANULOCYTES %: 0.6 % (ref 0–5)
LYMPHOCYTES ABSOLUTE: 0.87 E9/L (ref 1.5–4)
LYMPHOCYTES RELATIVE PERCENT: 8.3 % (ref 20–42)
MCH RBC QN AUTO: 28.9 PG (ref 26–35)
MCHC RBC AUTO-ENTMCNC: 32.7 % (ref 32–34.5)
MCV RBC AUTO: 88.4 FL (ref 80–99.9)
MONOCYTES ABSOLUTE: 1.1 E9/L (ref 0.1–0.95)
MONOCYTES RELATIVE PERCENT: 10.4 % (ref 2–12)
NEUTROPHILS ABSOLUTE: 8.44 E9/L (ref 1.8–7.3)
NEUTROPHILS RELATIVE PERCENT: 80 % (ref 43–80)
PDW BLD-RTO: 13.3 FL (ref 11.5–15)
PLATELET # BLD: 469 E9/L (ref 130–450)
PMV BLD AUTO: 9.3 FL (ref 7–12)
POTASSIUM SERPL-SCNC: 3.1 MMOL/L (ref 3.5–5)
RBC # BLD: 4.15 E12/L (ref 3.5–5.5)
SODIUM BLD-SCNC: 133 MMOL/L (ref 132–146)
WBC # BLD: 10.5 E9/L (ref 4.5–11.5)

## 2020-03-27 PROCEDURE — 6370000000 HC RX 637 (ALT 250 FOR IP): Performed by: INTERNAL MEDICINE

## 2020-03-27 PROCEDURE — 6360000002 HC RX W HCPCS: Performed by: INTERNAL MEDICINE

## 2020-03-27 PROCEDURE — 36415 COLL VENOUS BLD VENIPUNCTURE: CPT

## 2020-03-27 PROCEDURE — 6370000000 HC RX 637 (ALT 250 FOR IP): Performed by: SPECIALIST

## 2020-03-27 PROCEDURE — 99232 SBSQ HOSP IP/OBS MODERATE 35: CPT | Performed by: INTERNAL MEDICINE

## 2020-03-27 PROCEDURE — 80048 BASIC METABOLIC PNL TOTAL CA: CPT

## 2020-03-27 PROCEDURE — 2580000003 HC RX 258: Performed by: INTERNAL MEDICINE

## 2020-03-27 PROCEDURE — 85025 COMPLETE CBC W/AUTO DIFF WBC: CPT

## 2020-03-27 PROCEDURE — 2700000000 HC OXYGEN THERAPY PER DAY

## 2020-03-27 PROCEDURE — 2060000000 HC ICU INTERMEDIATE R&B

## 2020-03-27 RX ADMIN — LEVOTHYROXINE SODIUM 75 MCG: 75 TABLET ORAL at 05:04

## 2020-03-27 RX ADMIN — BENZONATATE 100 MG: 100 CAPSULE ORAL at 05:10

## 2020-03-27 RX ADMIN — BENZONATATE 100 MG: 100 CAPSULE ORAL at 22:42

## 2020-03-27 RX ADMIN — CHOLECALCIFEROL TAB 10 MCG (400 UNIT) 800 UNITS: 10 TAB at 08:47

## 2020-03-27 RX ADMIN — RIBAVIRIN 1200 MG: 200 CAPSULE ORAL at 22:41

## 2020-03-27 RX ADMIN — HYDROXYCHLOROQUINE SULFATE 200 MG: 200 TABLET, FILM COATED ORAL at 19:54

## 2020-03-27 RX ADMIN — RIBAVIRIN 1200 MG: 200 CAPSULE ORAL at 05:03

## 2020-03-27 RX ADMIN — DARUNAVIR ETHANOLATE AND COBICISTAT 1 EACH: 800; 150 TABLET, FILM COATED ORAL at 17:37

## 2020-03-27 RX ADMIN — BENZONATATE 100 MG: 100 CAPSULE ORAL at 12:03

## 2020-03-27 RX ADMIN — ENOXAPARIN SODIUM 40 MG: 40 INJECTION SUBCUTANEOUS at 08:46

## 2020-03-27 RX ADMIN — HYDROXYCHLOROQUINE SULFATE 200 MG: 200 TABLET, FILM COATED ORAL at 08:46

## 2020-03-27 RX ADMIN — RIBAVIRIN 1200 MG: 200 CAPSULE ORAL at 12:03

## 2020-03-27 RX ADMIN — AZITHROMYCIN MONOHYDRATE 500 MG: 250 TABLET ORAL at 19:54

## 2020-03-27 RX ADMIN — Medication 1 TABLET: at 12:02

## 2020-03-27 RX ADMIN — Medication 10 ML: at 22:42

## 2020-03-27 RX ADMIN — Medication 10 ML: at 08:47

## 2020-03-27 ASSESSMENT — PAIN SCALES - GENERAL
PAINLEVEL_OUTOF10: 0
PAINLEVEL_OUTOF10: 0

## 2020-03-27 NOTE — PLAN OF CARE
Problem: Falls - Risk of:  Goal: Will remain free from falls  Description: Will remain free from falls  Outcome: Met This Shift     Problem: Pain:  Goal: Pain level will decrease  Description: Pain level will decrease  Outcome: Met This Shift     Problem: Gas Exchange - Impaired:  Goal: Levels of oxygenation will improve  Description: Levels of oxygenation will improve  Outcome: Met This Shift

## 2020-03-27 NOTE — PROGRESS NOTES
[address] [place] [address] [facility]  [facility]  Progress Note    SUBJECTIVE:  Chief Complaint   Patient presents with    Fever     highest of 101.0    Fatigue     fatigue/weakness x 1 week    Cough     occasional dry cough     No new complaints today. She is still somewhat dyspneic. Still short of breath. Still having some loose stools. No fever. Review of systems:  As stated above in the chief complaint, otherwise negative. Medications:  Scheduled Meds:   darunavir-cobicistat  1 tablet Oral Daily    ribavirin  1,200 mg Oral Q8H    azithromycin  500 mg Oral Daily    hydroxychloroquine  200 mg Oral BID    levothyroxine  75 mcg Oral Daily    therapeutic multivitamin-minerals  1 tablet Oral Daily    vitamin D3  800 Units Oral Daily    sodium chloride flush  10 mL Intravenous 2 times per day    enoxaparin  40 mg Subcutaneous Daily     Continuous Infusions:  PRN Meds:benzonatate, polyvinyl alcohol, sodium chloride flush, acetaminophen **OR** acetaminophen, polyethylene glycol, promethazine **OR** ondansetron    OBJECTIVE:  /64   Pulse 98   Temp 97.9 °F (36.6 °C) (Oral)   Resp 20   Ht 5' 3\" (1.6 m)   Wt 135 lb (61.2 kg)   SpO2 91%   BMI 23.91 kg/m²   Temp  Av.1 °F (36.7 °C)  Min: 97.9 °F (36.6 °C)  Max: 98.2 °F (36.8 °C)  Constitutional: The patient is awake, alert, and oriented. He is still at 6 L nasal cannula. Skin: Warm and dry. No rashes were noted. HEENT: Round and reactive pupils. Moist mucous membranes. No ulcerations or thrush. Neck: Supple to movements. Chest: Crackles on lateral lung fields. Cardiovascular: Heart sounds rhythmic and regular. Abdomen: Positive bowel sounds to auscultation. Benign to palpation. Extremities: No edema.   Lines: peripheral    Laboratory and Tests Review:  Lab Results   Component Value Date    WBC 7.2 2020    WBC 8.3 2020    WBC 8.9 2020    HGB 11.7 2020    HCT 35.5 2020    MCV 87.9 2020  03/24/2020     Lab Results   Component Value Date    NEUTROABS 5.50 03/24/2020    NEUTROABS 7.45 (H) 03/20/2020    NEUTROABS 8.06 (H) 03/19/2020     No results found for: CRP  Lab Results   Component Value Date    ALT 23 03/24/2020    AST 24 03/24/2020    ALKPHOS 83 03/24/2020    BILITOT 0.5 03/24/2020     Lab Results   Component Value Date     03/24/2020    K 3.2 03/24/2020    CL 98 03/24/2020    CO2 24 03/24/2020    BUN 10 03/24/2020    CREATININE 0.6 03/24/2020    CREATININE 0.8 03/20/2020    CREATININE 0.8 03/19/2020    GFRAA >60 03/24/2020    LABGLOM >60 03/24/2020    GLUCOSE 104 03/24/2020    PROT 6.5 03/24/2020    LABALBU 2.9 03/24/2020    CALCIUM 8.4 03/24/2020    BILITOT 0.5 03/24/2020    ALKPHOS 83 03/24/2020    AST 24 03/24/2020    ALT 23 03/24/2020     Lab Results   Component Value Date    CRP 21.2 (H) 03/24/2020    CRP 27.2 (H) 03/21/2020     Lab Results   Component Value Date    SEDRATE 90 (H) 03/24/2020    SEDRATE 65 (H) 03/21/2020    SEDRATE 1 03/19/2020     Radiology:      Microbiology:   [address] NP swab by PCR: Pending  Blood cultures 3/19/2020 CoNS in 1 of 4 bottles  Respiratory culture: Ordered but not sent  Urine culture 3/20/2020 <10,000 GPO  Insert urine antigens  Respiratory panel: Negative  Rapid influenza: Negative  Procalcitonin: 0.13    ASSESSMENT:  · Possible SARS COV 2/COVID19 infection  · Probable viral pneumonia. Stable O2 requirements have not increased  · Fever associated to the above, improving  · CoNS bacteremia. Contaminant    PLAN:  · Continue oral Azithromycin  · Continue Hydroxychloroquine  · Continue protease inhibitor Prezcobix (Darunavir plus cobicistat) and Ribavirin  · Continue droplet/contact isolation.   Patient is not in a negative pressure room    Woody Aranda  1:13 PM  3/27/2020

## 2020-03-27 NOTE — PROGRESS NOTES
[facility] Progress Note    Admitting Date and Time: 3/19/2020  6:49 PM  Admit Dx: Pneumonia [J18.9]  Pneumonia [J18.9]    Subjective:  Patient is being followed for Pneumonia [J18.9]  Pneumonia [J18.9]   Pt feels tired and exhausted, still having cough and shortness of breath,     ROS: denies  chills, cp, n/v, HA unless stated above.       darunavir-cobicistat  1 tablet Oral Daily    ribavirin  1,200 mg Oral Q8H    azithromycin  500 mg Oral Daily    hydroxychloroquine  200 mg Oral BID    levothyroxine  75 mcg Oral Daily    therapeutic multivitamin-minerals  1 tablet Oral Daily    vitamin D3  800 Units Oral Daily    sodium chloride flush  10 mL Intravenous 2 times per day    enoxaparin  40 mg Subcutaneous Daily     benzonatate, 100 mg, TID PRN  polyvinyl alcohol, 1 drop, PRN  sodium chloride flush, 10 mL, PRN  acetaminophen, 650 mg, Q6H PRN    Or  acetaminophen, 650 mg, Q6H PRN  polyethylene glycol, 17 g, Daily PRN  promethazine, 12.5 mg, Q6H PRN    Or  ondansetron, 4 mg, Q6H PRN         Objective:    /62   Pulse 93   Temp 97.6 °F (36.4 °C) (Oral)   Resp 20   Ht 5' 3\" (1.6 m)   Wt 135 lb (61.2 kg)   SpO2 91%   BMI 23.91 kg/m²     General Appearance: alert and oriented to person, place and time and in mild to moderate acute distress  Skin: warm and dry  Head: normocephalic and atraumatic  Eyes: pupils equal, round, and reactive to light, extraocular eye movements intact, conjunctivae normal  Neck: neck supple and non tender without mass   Pulmonary/Chest: Crackles bilaterally- no wheezes, mild respiratory distress  Cardiovascular: normal rate, normal S1 and S2 and no carotid bruits  Abdomen: soft, non-tender, non-distended, normal bowel sounds, no masses or organomegaly  Extremities: no cyanosis, no clubbing and no edema  Neurologic: no cranial nerve deficit and speech normal    No results for input(s): NA, K, CL, CO2, BUN, CREATININE, GLUCOSE, CALCIUM in the last 72 hours. No results for input(s): WBC, RBC, HGB, HCT, MCV, MCH, MCHC, RDW, PLT, MPV in the last 72 hours. Assessment:    Active Problems:    Pneumonia  Resolved Problems:    * No resolved hospital problems. *      Plan:  1. Acute hypoxic respiratory failure, worsening oxygenation, will need intubation. She is full code. 2.  Acute viral pneumonia, respiratory panel came back positive for staph,  Obtain sputum cultures, consult ID, continue azithro and plaquenil day 9,   3. Viral pneumonia, pending COVID-19 started on plaquenil and azithro  3. Bacteremia coags - staph, most likely contaminated, received a dose of vanc, appreciate ID input, stop vanc   4. Hypothyroidism, continue levothyroxine. NOTE: This report was transcribed using voice recognition software. Every effort was made to ensure accuracy; however, inadvertent computerized transcription errors may be present.   Electronically signed by Ashley Carlisle MD on 3/27/2020 at 7:11 PM

## 2020-03-27 NOTE — CARE COORDINATION
3/27/2020  Isolation continued to r/o Covid-19. Pt is on plaquenil and zithromax. Currently on 6lnc. Watch for home oxygen needs. PT may be helpful when appropriate. CM/SS to follow.  Electronically signed by Earlene Piper RN-BC on 3/27/2020 at 9:53 AM

## 2020-03-28 PROCEDURE — 6370000000 HC RX 637 (ALT 250 FOR IP): Performed by: SPECIALIST

## 2020-03-28 PROCEDURE — 6360000002 HC RX W HCPCS: Performed by: INTERNAL MEDICINE

## 2020-03-28 PROCEDURE — 6370000000 HC RX 637 (ALT 250 FOR IP): Performed by: INTERNAL MEDICINE

## 2020-03-28 PROCEDURE — 99233 SBSQ HOSP IP/OBS HIGH 50: CPT | Performed by: INTERNAL MEDICINE

## 2020-03-28 PROCEDURE — 51702 INSERT TEMP BLADDER CATH: CPT

## 2020-03-28 PROCEDURE — 1200000000 HC SEMI PRIVATE

## 2020-03-28 PROCEDURE — 2700000000 HC OXYGEN THERAPY PER DAY

## 2020-03-28 PROCEDURE — 94761 N-INVAS EAR/PLS OXIMETRY MLT: CPT

## 2020-03-28 PROCEDURE — 2580000003 HC RX 258: Performed by: INTERNAL MEDICINE

## 2020-03-28 RX ORDER — MINERAL OIL AND WHITE PETROLATUM 150; 830 MG/G; MG/G
OINTMENT OPHTHALMIC PRN
Status: DISCONTINUED | OUTPATIENT
Start: 2020-03-28 | End: 2020-04-06 | Stop reason: HOSPADM

## 2020-03-28 RX ADMIN — Medication 10 ML: at 08:33

## 2020-03-28 RX ADMIN — HYDROXYCHLOROQUINE SULFATE 200 MG: 200 TABLET, FILM COATED ORAL at 23:24

## 2020-03-28 RX ADMIN — CHOLECALCIFEROL TAB 10 MCG (400 UNIT) 800 UNITS: 10 TAB at 08:32

## 2020-03-28 RX ADMIN — Medication 1 TABLET: at 08:32

## 2020-03-28 RX ADMIN — RIBAVIRIN 1200 MG: 200 CAPSULE ORAL at 05:10

## 2020-03-28 RX ADMIN — BENZONATATE 100 MG: 100 CAPSULE ORAL at 06:21

## 2020-03-28 RX ADMIN — RIBAVIRIN 1200 MG: 200 CAPSULE ORAL at 12:30

## 2020-03-28 RX ADMIN — Medication 10 ML: at 23:24

## 2020-03-28 RX ADMIN — DARUNAVIR ETHANOLATE AND COBICISTAT 1 EACH: 800; 150 TABLET, FILM COATED ORAL at 18:06

## 2020-03-28 RX ADMIN — MINERAL OIL AND WHITE PETROLATUM: 150; 830 OINTMENT OPHTHALMIC at 23:24

## 2020-03-28 RX ADMIN — LEVOTHYROXINE SODIUM 75 MCG: 75 TABLET ORAL at 06:21

## 2020-03-28 RX ADMIN — RIBAVIRIN 1200 MG: 200 CAPSULE ORAL at 23:24

## 2020-03-28 RX ADMIN — ACETAMINOPHEN 650 MG: 325 TABLET ORAL at 11:06

## 2020-03-28 RX ADMIN — ENOXAPARIN SODIUM 40 MG: 40 INJECTION SUBCUTANEOUS at 08:32

## 2020-03-28 RX ADMIN — AZITHROMYCIN MONOHYDRATE 500 MG: 250 TABLET ORAL at 23:24

## 2020-03-28 RX ADMIN — HYDROXYCHLOROQUINE SULFATE 200 MG: 200 TABLET, FILM COATED ORAL at 08:33

## 2020-03-28 ASSESSMENT — PAIN SCALES - GENERAL
PAINLEVEL_OUTOF10: 0

## 2020-03-28 NOTE — PROGRESS NOTES
[facility] Progress Note    Admitting Date and Time: 3/19/2020  6:49 PM  Admit Dx: Pneumonia [J18.9]  Pneumonia [J18.9]    Subjective:  Patient is being followed for Pneumonia [J18.9]  Pneumonia [J18.9]   Pt feels tired and exhausted, still having cough and shortness of breath, now with fever at 100.4  Worse today, O2 sat down to 88 on 7 L, 90 on 8L 90 on 10L    ROS: denies  chills, cp, n/v, HA unless stated above.       darunavir-cobicistat  1 tablet Oral Daily    ribavirin  1,200 mg Oral Q8H    azithromycin  500 mg Oral Daily    hydroxychloroquine  200 mg Oral BID    levothyroxine  75 mcg Oral Daily    therapeutic multivitamin-minerals  1 tablet Oral Daily    vitamin D3  800 Units Oral Daily    sodium chloride flush  10 mL Intravenous 2 times per day    enoxaparin  40 mg Subcutaneous Daily     benzonatate, 100 mg, TID PRN  polyvinyl alcohol, 1 drop, PRN  sodium chloride flush, 10 mL, PRN  acetaminophen, 650 mg, Q6H PRN    Or  acetaminophen, 650 mg, Q6H PRN  polyethylene glycol, 17 g, Daily PRN  promethazine, 12.5 mg, Q6H PRN    Or  ondansetron, 4 mg, Q6H PRN         Objective:    BP (!) 103/56   Pulse 95   Temp 100.4 °F (38 °C) (Axillary)   Resp 30   Ht 5' 3\" (1.6 m)   Wt 135 lb (61.2 kg)   SpO2 90%   BMI 23.91 kg/m²     General Appearance: alert and oriented to person, place and time and in mild to moderate acute distress  Skin: warm and dry  Head: normocephalic and atraumatic  Eyes: pupils equal, round, and reactive to light, extraocular eye movements intact, conjunctivae normal  Neck: neck supple and non tender without mass   Pulmonary/Chest: Crackles bilaterally- no wheezes, mild respiratory distress  Cardiovascular: normal rate, normal S1 and S2 and no carotid bruits  Abdomen: soft, non-tender, non-distended, normal bowel sounds, no masses or organomegaly  Extremities: no cyanosis, no clubbing and no edema  Neurologic: no cranial nerve deficit and speech normal    Recent Labs     03/27/20 2020      K 3.1*   CL 94*   CO2 24   BUN 9   CREATININE 0.8   GLUCOSE 105*   CALCIUM 8.7       Recent Labs     03/27/20 2020   WBC 10.5   RBC 4.15   HGB 12.0   HCT 36.7   MCV 88.4   MCH 28.9   MCHC 32.7   RDW 13.3   *   MPV 9.3     Assessment:    Active Problems:    Pneumonia  Resolved Problems:    * No resolved hospital problems. *      Plan:  1. Acute hypoxic respiratory failure, worsening oxygenation, will need intubation. She is full code. I had to contact the intensivist given the instability of her oxygenation. The intensivist recommended transferring the patient to the ICU for monitoring him probably intubation if needed. 2.  Acute viral pneumonia, respiratory panel came back positive for staph,  Obtain sputum cultures, consult ID, continue azithro and plaquenil day 9,   3. Viral pneumonia, pending COVID-19 started on plaquenil and azithro  3. Bacteremia coags - staph, most likely contaminated, received a dose of vanc, appreciate ID input, stop vanc   4. Hypothyroidism, continue levothyroxine. NOTE: This report was transcribed using voice recognition software. Every effort was made to ensure accuracy; however, inadvertent computerized transcription errors may be present.   Electronically signed by Jacy Camp MD on 3/28/2020 at 7:32 PM

## 2020-03-28 NOTE — PROGRESS NOTES
Patient transferred from 6S. Vitals are stable and she denies pain or discomfort. She is very short of breath on exertion. Will continue to monitor patient.

## 2020-03-28 NOTE — PROGRESS NOTES
Clinical manager made aware of transfer order she will let us know when they get a bed assignment for an ICU room

## 2020-03-28 NOTE — PROGRESS NOTES
[address] [place] [address] [facility]  [facility]  Progress Note    SUBJECTIVE:  Chief Complaint   Patient presents with    Fever     highest of 101.0    Fatigue     fatigue/weakness x 1 week    Cough     occasional dry cough     The patient is still having some dyspnea. Loose stools. She is having no low-grade fevers. Still coughing. Review of systems:  As stated above in the chief complaint, otherwise negative. Medications:  Scheduled Meds:   darunavir-cobicistat  1 tablet Oral Daily    ribavirin  1,200 mg Oral Q8H    azithromycin  500 mg Oral Daily    hydroxychloroquine  200 mg Oral BID    levothyroxine  75 mcg Oral Daily    therapeutic multivitamin-minerals  1 tablet Oral Daily    vitamin D3  800 Units Oral Daily    sodium chloride flush  10 mL Intravenous 2 times per day    enoxaparin  40 mg Subcutaneous Daily     Continuous Infusions:  PRN Meds:benzonatate, polyvinyl alcohol, sodium chloride flush, acetaminophen **OR** acetaminophen, polyethylene glycol, promethazine **OR** ondansetron    OBJECTIVE:  BP (!) 103/56   Pulse 95   Temp 100.4 °F (38 °C) (Axillary)   Resp 30   Ht 5' 3\" (1.6 m)   Wt 135 lb (61.2 kg)   SpO2 91%   BMI 23.91 kg/m²   Temp  Av.5 °F (36.9 °C)  Min: 97.5 °F (36.4 °C)  Max: 100.4 °F (38 °C)  Constitutional: Patient is sitting in bed. Cheeks are flushed. He is now at 10 L nasal cannula. Skin: Warm and dry. No rashes were noted. HEENT: Round and reactive pupils. Moist mucous membranes. No ulcerations or thrush. Neck: Supple to movements. Chest: Crackles on lateral lung fields. Cardiovascular: Heart sounds rhythmic and regular. Abdomen: Positive bowel sounds to auscultation. Benign to palpation. Extremities: No edema.   Lines: peripheral    Laboratory and Tests Review:  Lab Results   Component Value Date    WBC 10.5 2020    WBC 7.2 2020    WBC 8.3 2020    HGB 12.0 2020    HCT 36.7 2020    MCV 88.4 2020    PLT 469 (H) 03/27/2020     Lab Results   Component Value Date    NEUTROABS 8.44 (H) 03/27/2020    NEUTROABS 5.50 03/24/2020    NEUTROABS 7.45 (H) 03/20/2020     No results found for: [facility]  Lab Results   Component Value Date    ALT 23 03/24/2020    AST 24 03/24/2020    ALKPHOS 83 03/24/2020    BILITOT 0.5 03/24/2020     Lab Results   Component Value Date     03/27/2020    K 3.1 03/27/2020    CL 94 03/27/2020    CO2 24 03/27/2020    BUN 9 03/27/2020    CREATININE 0.8 03/27/2020    CREATININE 0.6 03/24/2020    CREATININE 0.8 03/20/2020    GFRAA >60 03/27/2020    LABGLOM >60 03/27/2020    GLUCOSE 105 03/27/2020    PROT 6.5 03/24/2020    LABALBU 2.9 03/24/2020    CALCIUM 8.7 03/27/2020    BILITOT 0.5 03/24/2020    ALKPHOS 83 03/24/2020    AST 24 03/24/2020    ALT 23 03/24/2020     Lab Results   Component Value Date    CRP 21.2 (H) 03/24/2020    CRP 27.2 (H) 03/21/2020     Lab Results   Component Value Date    SEDRATE 90 (H) 03/24/2020    SEDRATE 65 (H) 03/21/2020    SEDRATE 1 03/19/2020     Radiology:      Microbiology:   [address] NP swab by PCR: Pending  Blood cultures 3/19/2020 CoNS in 1 of 4 bottles  Respiratory culture: Ordered but not sent  Urine culture 3/20/2020 <10,000 GPO  Insert urine antigens  Respiratory panel: Negative  Rapid influenza: Negative  Procalcitonin: 0.13    ASSESSMENT:  · Possible SARS COV 2/COVID19 infection  · Probable viral pneumonia. Stable O2 requirements have not increased  · Fever associated to the above, improving  · CoNS bacteremia. Contaminant    PLAN:  · Continue oral Azithromycin  · Continue Hydroxychloroquine  · Continue protease inhibitor Prezcobix (Darunavir plus cobicistat) and Ribavirin  · Continue droplet/contact isolation.   Patient is not in a negative pressure room    Victory Hunt  11:06 AM  3/28/2020

## 2020-03-28 NOTE — PROGRESS NOTES
pts O2 saturations dropped saúl to 78% while getting up to the bedside commode on 8L.  Once she got back in bed she recovered to 92% on 8L

## 2020-03-28 NOTE — PROGRESS NOTES
The patient is asking for eye drops for her dry eyes.  I called Dr Maurice Rader there was no answer will try again later

## 2020-03-28 NOTE — PROGRESS NOTES
Tried calling report to ICU for transfer into 204. They said they are working on transferring the current patient out of that room and they will call us when that nurse is ready for report.

## 2020-03-28 NOTE — PROGRESS NOTES
Dr Nadeen Lackey on the unit made aware of MEWS score of a 3 since this morning and that we needed to turn her O2 up to 10 from 8 and her O2 sat was 90 on the 10 L

## 2020-03-29 PROCEDURE — 99233 SBSQ HOSP IP/OBS HIGH 50: CPT | Performed by: INTERNAL MEDICINE

## 2020-03-29 PROCEDURE — 2000000000 HC ICU R&B

## 2020-03-29 PROCEDURE — 6370000000 HC RX 637 (ALT 250 FOR IP): Performed by: INTERNAL MEDICINE

## 2020-03-29 PROCEDURE — 2580000003 HC RX 258: Performed by: INTERNAL MEDICINE

## 2020-03-29 PROCEDURE — 6370000000 HC RX 637 (ALT 250 FOR IP): Performed by: SPECIALIST

## 2020-03-29 PROCEDURE — 2700000000 HC OXYGEN THERAPY PER DAY

## 2020-03-29 PROCEDURE — 6360000002 HC RX W HCPCS: Performed by: INTERNAL MEDICINE

## 2020-03-29 PROCEDURE — 94761 N-INVAS EAR/PLS OXIMETRY MLT: CPT

## 2020-03-29 PROCEDURE — 87081 CULTURE SCREEN ONLY: CPT

## 2020-03-29 RX ADMIN — AZITHROMYCIN MONOHYDRATE 500 MG: 250 TABLET ORAL at 20:13

## 2020-03-29 RX ADMIN — RIBAVIRIN 1200 MG: 200 CAPSULE ORAL at 11:01

## 2020-03-29 RX ADMIN — Medication 10 ML: at 09:50

## 2020-03-29 RX ADMIN — Medication 1 TABLET: at 09:49

## 2020-03-29 RX ADMIN — Medication 10 ML: at 20:14

## 2020-03-29 RX ADMIN — RIBAVIRIN 1200 MG: 200 CAPSULE ORAL at 20:13

## 2020-03-29 RX ADMIN — HYDROXYCHLOROQUINE SULFATE 200 MG: 200 TABLET, FILM COATED ORAL at 09:49

## 2020-03-29 RX ADMIN — DARUNAVIR ETHANOLATE AND COBICISTAT 1 EACH: 800; 150 TABLET, FILM COATED ORAL at 18:01

## 2020-03-29 RX ADMIN — MINERAL OIL AND WHITE PETROLATUM: 150; 830 OINTMENT OPHTHALMIC at 09:47

## 2020-03-29 RX ADMIN — ENOXAPARIN SODIUM 40 MG: 40 INJECTION SUBCUTANEOUS at 09:48

## 2020-03-29 RX ADMIN — LEVOTHYROXINE SODIUM 75 MCG: 75 TABLET ORAL at 06:46

## 2020-03-29 RX ADMIN — RIBAVIRIN 1200 MG: 200 CAPSULE ORAL at 04:03

## 2020-03-29 RX ADMIN — BENZONATATE 100 MG: 100 CAPSULE ORAL at 04:09

## 2020-03-29 RX ADMIN — HYDROXYCHLOROQUINE SULFATE 200 MG: 200 TABLET, FILM COATED ORAL at 20:13

## 2020-03-29 RX ADMIN — CHOLECALCIFEROL TAB 10 MCG (400 UNIT) 800 UNITS: 10 TAB at 09:48

## 2020-03-29 ASSESSMENT — PAIN SCALES - WONG BAKER: WONGBAKER_NUMERICALRESPONSE: 0

## 2020-03-29 ASSESSMENT — PAIN SCALES - GENERAL
PAINLEVEL_OUTOF10: 0

## 2020-03-29 NOTE — CONSULTS
Salima Rogers M.D.,[facility]  Barry Lam D.O., F.A.C.O.I., Anil Caceres M.D. Laure Moore M.D., Zain Mccarty M.D. Patient:  Luz East 75 y.o. female MRN: [id number]     Date of Service: 3/29/2020      PULMONARY CONSULTATION    Reason for Consultation: pneumonia, COVID-19 rule out  Referring Physician: Dr. Aparna Lira    Communication with the referring physician will be sent via the electronic medical record. Chief Complaint: shortness of breath    CODE STATUS: Full    SUBJECTIVE:  HPI:  Luz East is a 75 y.o. female with a past medical history of hypothyroidism who presented to the ED with cough, shortness of breath and weakness for about a week and a half. She had a cough with occasional productive sputum. She had a fever one time. She was on 2L oxygen at the time of admission on 3/19. She had not had any sick contacts and had been practicing social distancing. Patient was seen today lying in bed, quite tachypneic. She is 92% on 8L oxygen, but she says she is getting quite tired. It may be prudent to move her to ICU for closer monitoring. She has very little cough. History reviewed. No pertinent past medical history. No past surgical history on file. No family history on file.     Social History:   Social History     Socioeconomic History    Marital status:      Spouse name: Not on file    Number of children: Not on file    Years of education: Not on file    Highest education level: Not on file   Occupational History    Not on file   Social Needs    Financial resource strain: Not on file    Food insecurity     Worry: Not on file     Inability: Not on file   [employer] needs     Medical: Not on file     Non-medical: Not on file   Tobacco Use    Smoking status: Never Smoker    Smokeless tobacco: Never Used   Substance and Sexual Activity    Alcohol use: Not on file    Drug use: Not on file    Sexual activity: Not on file   Lifestyle  Physical activity     Days per week: Not on file     Minutes per session: Not on file    Stress: Not on file   Relationships    Social connections     Talks on phone: Not on file     Gets together: Not on file     Attends Latter day service: Not on file     Active member of club or organization: Not on file     Attends meetings of clubs or organizations: Not on file     Relationship status: Not on file    Intimate partner violence     Fear of current or ex partner: Not on file     Emotionally abused: Not on file     Physically abused: Not on file     Forced sexual activity: Not on file   Other Topics Concern    Not on file   Social History Narrative    Not on file     Smoking history: The patient is a Never smoker   ETOH:   has no history on file for alcohol. Exposures: There  is not history of TB or TB exposure. There is not asbestos or silica dust exposure. The patient reports does not have coal, foundry, quarry or [employer] exposure. Recent travel history none. There is not  history of recreational or IV drug use. There is not hot tub exposure. The patient does not have any exotic pets, turtles or exotic birds. Vaccines:    Influenza:  Unknown  Pneumococcal Polysaccharide:  unknown    There is no immunization history on file for this patient.      Home Meds: Medications Prior to Admission: sulfamethoxazole-trimethoprim (BACTRIM DS;SEPTRA DS) 800-160 MG per tablet, Take 1 tablet by mouth 2 times daily  acetaminophen (TYLENOL) 500 MG tablet, Take 500 mg by mouth every 6 hours as needed for Pain  ibuprofen (ADVIL;MOTRIN) 200 MG tablet, Take 200 mg by mouth every 6 hours as needed for Pain  levothyroxine (SYNTHROID) 75 MCG tablet, Take 75 mcg by mouth Daily  Multiple Vitamins-Minerals (THERAPEUTIC MULTIVITAMIN-MINERALS) tablet, Take 1 tablet by mouth daily  vitamin D3 (CHOLECALCIFEROL) 10 MCG (400 UNIT) TABS tablet, Take 800 Units by mouth daily  Bioflavonoid Products (HUBER-C) TABS, Take by mouth  SUMAtriptan (IMITREX) 50 MG tablet, Take 50 mg by mouth once as needed for Migraine  ESTROPIPATE PO, Take 0.625 mg by mouth daily    CURRENT MEDS :  Scheduled Meds:   darunavir-cobicistat  1 tablet Oral Daily    ribavirin  1,200 mg Oral Q8H    azithromycin  500 mg Oral Daily    hydroxychloroquine  200 mg Oral BID    levothyroxine  75 mcg Oral Daily    therapeutic multivitamin-minerals  1 tablet Oral Daily    vitamin D3  800 Units Oral Daily    sodium chloride flush  10 mL Intravenous 2 times per day    enoxaparin  40 mg Subcutaneous Daily       Continuous Infusions: Allergies   Allergen Reactions    Ciprofloxacin     Pcn [Penicillins]        REVIEW OF SYSTEMS:  Constitutional: Denies fever, weight loss, night sweats, and fatigue  Skin: Denies pigmentation, dark lesions, and rashes   HEENT: Denies hearing loss, tinnitus, ear drainage, epistaxis, sore throat, and hoarseness. Cardiovascular: Denies palpitations, chest pain, and chest pressure. Respiratory: Denies  hemoptysis, apnea, and choking.   Gastrointestinal: Denies nausea, vomiting, poor appetite, diarrhea, heartburn or reflux  Genitourinary: Denies dysuria, frequency, urgency or hematuria  Musculoskeletal: Denies myalgias, muscle weakness, and bone pain  Neurological: Denies dizziness, vertigo, headache, and focal weakness  Psychological: Denies anxiety and depression  Endocrine: Denies heat intolerance and cold intolerance  Hematopoietic/Lymphatic: Denies bleeding problems and blood transfusions    OBJECTIVE:   BP (!) 121/58   Pulse 90   Temp 98 °F (36.7 °C) (Oral)   Resp 22   Ht 5' 3\" (1.6 m)   Wt 135 lb (61.2 kg)   SpO2 93%   BMI 23.91 kg/m²   SpO2 Readings from Last 1 Encounters:   03/29/20 93%        I/O:    Intake/Output Summary (Last 24 hours) at 3/29/2020 1222  Last data filed at 3/29/2020 0656  Gross per 24 hour   Intake --   Output 600 ml   Net -600 ml     Vent Information  FiO2 : 21 %    Physical Exam:  General: The patient is lying in bed tachypneic in the 30s  HEENT: Pupils are equal round and reactive to light, there are no oral lesions and no post-nasal drip   Neck: supple without adenopathy  Cardiovascular: regular rate and rhythm without murmur or gallop  Respiratory: few scattered crackles throughout. Air entry is symmetric  Abdomen: soft, non-tender, non-distended, normal bowel sounds  Extremities: warm, no edema, no clubbing  Skin: no rash or lesion  Neurologic: CN II-XII grossly intact, no focal deficits    Pulmonary Function Testing personally reviewed and interpreted  None on file    Imaging personally reviewed:  3/19  Findings consistent with multilobar pneumonia of the bilateral midlung   zones and left lung base.       Blunting of the left costophrenic angle, suggests small left pleural   effusion. 3/19  1. Patchy areas of groundglass opacity with interlobular septal   thickening seen within the bilateral upper and to a lesser extent   lower lobes, consistent with multilobar pneumonia.         Echo:  None on file    Labs:  Lab Results   Component Value Date    WBC 10.5 03/27/2020    HGB 12.0 03/27/2020    HCT 36.7 03/27/2020    MCV 88.4 03/27/2020    MCH 28.9 03/27/2020    MCHC 32.7 03/27/2020    RDW 13.3 03/27/2020     03/27/2020    MPV 9.3 03/27/2020     Lab Results   Component Value Date     03/27/2020    K 3.1 03/27/2020    CL 94 03/27/2020    CO2 24 03/27/2020    BUN 9 03/27/2020    CREATININE 0.8 03/27/2020    LABALBU 2.9 03/24/2020    CALCIUM 8.7 03/27/2020    GFRAA >60 03/27/2020    LABGLOM >60 03/27/2020     No results found for: PROTIME, INR  No results for input(s): PROBNP in the last 72 hours. No results for input(s): TROPONINI in the last 72 hours. No results for input(s): PROCAL in the last 72 hours. This SmartLink has not been configured with any valid records. Assessment:  1. Pneumonia  2. R/o COVID-19  3. staphylococcus aureus bacteremia    Plan:  1.  Oxygen therapy as

## 2020-03-29 NOTE — PROGRESS NOTES
Spoke with pt's sister, Robina Hines, and notified of transfer to ICU. Danya Valenzuela states that she will notify pt's son and her other sister.

## 2020-03-29 NOTE — PROGRESS NOTES
[facility] Progress Note    Admitting Date and Time: 3/19/2020  6:49 PM  Admit Dx: Pneumonia [J18.9]  Pneumonia [J18.9]    Subjective:  Patient is being followed for Pneumonia [J18.9]  Pneumonia [J18.9]   Pt feels tired and exhausted, still having cough and shortness of breath, now with fever at 100.4  O2 sat 90 on 8L  Patient reported feeling tired with breathing    ROS: denies  chills, cp, n/v, HA unless stated above.       darunavir-cobicistat  1 tablet Oral Daily    ribavirin  1,200 mg Oral Q8H    azithromycin  500 mg Oral Daily    hydroxychloroquine  200 mg Oral BID    levothyroxine  75 mcg Oral Daily    therapeutic multivitamin-minerals  1 tablet Oral Daily    vitamin D3  800 Units Oral Daily    sodium chloride flush  10 mL Intravenous 2 times per day    enoxaparin  40 mg Subcutaneous Daily     artificial tears, , PRN  benzonatate, 100 mg, TID PRN  polyvinyl alcohol, 1 drop, PRN  sodium chloride flush, 10 mL, PRN  acetaminophen, 650 mg, Q6H PRN    Or  acetaminophen, 650 mg, Q6H PRN  polyethylene glycol, 17 g, Daily PRN  promethazine, 12.5 mg, Q6H PRN    Or  ondansetron, 4 mg, Q6H PRN         Objective:    BP (!) 121/58   Pulse 90   Temp 98 °F (36.7 °C) (Oral)   Resp 22   Ht 5' 3\" (1.6 m)   Wt 135 lb (61.2 kg)   SpO2 93%   BMI 23.91 kg/m²     General Appearance: alert and oriented to person, place and time and in mild to moderate acute distress  Skin: warm and dry  Head: normocephalic and atraumatic  Eyes: pupils equal, round, and reactive to light, extraocular eye movements intact, conjunctivae normal  Neck: neck supple and non tender without mass   Pulmonary/Chest: Crackles bilaterally- no wheezes, mild respiratory distress  Cardiovascular: normal rate, normal S1 and S2 and no carotid bruits  Abdomen: soft, non-tender, non-distended, normal bowel sounds, no masses or organomegaly  Extremities: no cyanosis, no clubbing and no edema  Neurologic: no cranial nerve deficit and speech normal    Recent Labs     03/27/20 2020      K 3.1*   CL 94*   CO2 24   BUN 9   CREATININE 0.8   GLUCOSE 105*   CALCIUM 8.7       Recent Labs     03/27/20 2020   WBC 10.5   RBC 4.15   HGB 12.0   HCT 36.7   MCV 88.4   MCH 28.9   MCHC 32.7   RDW 13.3   *   MPV 9.3     Assessment:    Active Problems:    Pneumonia    Acute respiratory failure with hypoxia (HCC)    Staphylococcus aureus bacteremia  Resolved Problems:    * No resolved hospital problems. *      Plan:  1. Acute hypoxic respiratory failure, worsening oxygenation, will need intubation. She is full code. I had to contact the intensivist given the instability of her oxygenation. pulm is on board. Discussed again today, pulm would wan the patient to be transferred to the ICU  2. Acute viral pneumonia, respiratory panel came back positive for staph,  Obtain sputum cultures, consult ID, continue azithro and plaquenil day 9,   3. Viral pneumonia, pending COVID-19 started on plaquenil and azithro, appreciate ID input, added Prezcobix  3. Bacteremia coags - staph, most likely contaminated, received a dose of vanc, appreciate ID input, stop vanc   4. Hypothyroidism, continue levothyroxine. NOTE: This report was transcribed using voice recognition software. Every effort was made to ensure accuracy; however, inadvertent computerized transcription errors may be present.   Electronically signed by Kingsley De La O MD on 3/29/2020 at 1:14 PM

## 2020-03-29 NOTE — PROGRESS NOTES
Re-evaluated patient. She is ill appearing but speaking in full sentences and not short of breath or using accessory muscles. Saturations on 8 lpm hiflo is 91%. Will have the nursing staff insert a nesbitt catheter as she states walking to the bathroom causes her symptoms. If there is any change in symptoms or desaturation she will be transferred to icu. Plan discussed with icu charge nurse and patients nurse. Chase Acharya M.D.    Pulmonary/Critical Care Medicine

## 2020-03-29 NOTE — PROGRESS NOTES
Report called to [facility] in ICU. PT transferred in bed with all belongings and O2 on at 8L via high flow nasal cannula. Pt requesting that her sister be called and notified of transfer.

## 2020-03-29 NOTE — PROGRESS NOTES
12.0 03/27/2020    HCT 36.7 03/27/2020    MCV 88.4 03/27/2020     (H) 03/27/2020     Lab Results   Component Value Date    NEUTROABS 8.44 (H) 03/27/2020    NEUTROABS 5.50 03/24/2020    NEUTROABS 7.45 (H) 03/20/2020     No results found for: [facility]  Lab Results   Component Value Date    ALT 23 03/24/2020    AST 24 03/24/2020    ALKPHOS 83 03/24/2020    BILITOT 0.5 03/24/2020     Lab Results   Component Value Date     03/27/2020    K 3.1 03/27/2020    CL 94 03/27/2020    CO2 24 03/27/2020    BUN 9 03/27/2020    CREATININE 0.8 03/27/2020    CREATININE 0.6 03/24/2020    CREATININE 0.8 03/20/2020    GFRAA >60 03/27/2020    LABGLOM >60 03/27/2020    GLUCOSE 105 03/27/2020    PROT 6.5 03/24/2020    LABALBU 2.9 03/24/2020    CALCIUM 8.7 03/27/2020    BILITOT 0.5 03/24/2020    ALKPHOS 83 03/24/2020    AST 24 03/24/2020    ALT 23 03/24/2020     Lab Results   Component Value Date    CRP 21.2 (H) 03/24/2020    CRP 27.2 (H) 03/21/2020     Lab Results   Component Value Date    SEDRATE 90 (H) 03/24/2020    SEDRATE 65 (H) 03/21/2020    SEDRATE 1 03/19/2020     Radiology:      Microbiology:   [address] NP swab by PCR (Sent to [place] [address]): Pending  Blood cultures 3/19/2020 CoNS in 1 of 4 bottles  Respiratory culture: Ordered but not sent  Urine culture 3/20/2020 <10,000 GPO  Insert urine antigens  Respiratory panel: Negative  Rapid influenza: Negative  Procalcitonin: 0.13    ASSESSMENT:  · Possible SARS COV 2/COVID19 infection. Still awaiting for results. · Probable viral pneumonia. Stable O2 requirements have not increased  · Fever associated to the above, improving  · CoNS bacteremia. Contaminant  · Diarrhea secondary to COVID19 versus antibiotic associated diarrhea. Possible C. difficile    PLAN:  · Continue oral Azithromycin  · Continue Hydroxychloroquine  · Continue protease inhibitor Prezcobix (Darunavir plus cobicistat) and Ribavirin  · Continue droplet/contact isolation.   Patient is not in a negative pressure room  · Stools were C. difficile    Brice Srivastava  10:27 AM  3/29/2020

## 2020-03-30 LAB
ALBUMIN SERPL-MCNC: 2.8 G/DL (ref 3.5–5.2)
ALP BLD-CCNC: 137 U/L (ref 35–104)
ALT SERPL-CCNC: 28 U/L (ref 0–32)
ANION GAP SERPL CALCULATED.3IONS-SCNC: 15 MMOL/L (ref 7–16)
AST SERPL-CCNC: 27 U/L (ref 0–31)
BILIRUB SERPL-MCNC: 0.8 MG/DL (ref 0–1.2)
BUN BLDV-MCNC: 10 MG/DL (ref 8–23)
C DIFF TOXIN/ANTIGEN: NORMAL
C-REACTIVE PROTEIN: 27.6 MG/DL (ref 0–0.4)
CALCIUM SERPL-MCNC: 8.7 MG/DL (ref 8.6–10.2)
CHLORIDE BLD-SCNC: 98 MMOL/L (ref 98–107)
CO2: 23 MMOL/L (ref 22–29)
CREAT SERPL-MCNC: 0.7 MG/DL (ref 0.5–1)
GFR AFRICAN AMERICAN: >60
GFR NON-AFRICAN AMERICAN: >60 ML/MIN/1.73
GLUCOSE BLD-MCNC: 91 MG/DL (ref 74–99)
HCT VFR BLD CALC: 35 % (ref 34–48)
HEMOGLOBIN: 11.6 G/DL (ref 11.5–15.5)
MCH RBC QN AUTO: 29 PG (ref 26–35)
MCHC RBC AUTO-ENTMCNC: 33.1 % (ref 32–34.5)
MCV RBC AUTO: 87.5 FL (ref 80–99.9)
PDW BLD-RTO: 13.2 FL (ref 11.5–15)
PLATELET # BLD: 392 E9/L (ref 130–450)
PMV BLD AUTO: 9.6 FL (ref 7–12)
POTASSIUM SERPL-SCNC: 3.5 MMOL/L (ref 3.5–5)
RBC # BLD: 4 E12/L (ref 3.5–5.5)
SEDIMENTATION RATE, ERYTHROCYTE: 110 MM/HR (ref 0–20)
SODIUM BLD-SCNC: 136 MMOL/L (ref 132–146)
TOTAL PROTEIN: 7.4 G/DL (ref 6.4–8.3)
WBC # BLD: 11.1 E9/L (ref 4.5–11.5)

## 2020-03-30 PROCEDURE — 87449 NOS EACH ORGANISM AG IA: CPT

## 2020-03-30 PROCEDURE — 86140 C-REACTIVE PROTEIN: CPT

## 2020-03-30 PROCEDURE — 2580000003 HC RX 258: Performed by: INTERNAL MEDICINE

## 2020-03-30 PROCEDURE — 36415 COLL VENOUS BLD VENIPUNCTURE: CPT

## 2020-03-30 PROCEDURE — 80053 COMPREHEN METABOLIC PANEL: CPT

## 2020-03-30 PROCEDURE — 2700000000 HC OXYGEN THERAPY PER DAY

## 2020-03-30 PROCEDURE — 6370000000 HC RX 637 (ALT 250 FOR IP): Performed by: INTERNAL MEDICINE

## 2020-03-30 PROCEDURE — 6370000000 HC RX 637 (ALT 250 FOR IP): Performed by: SPECIALIST

## 2020-03-30 PROCEDURE — 6360000002 HC RX W HCPCS: Performed by: INTERNAL MEDICINE

## 2020-03-30 PROCEDURE — 85027 COMPLETE CBC AUTOMATED: CPT

## 2020-03-30 PROCEDURE — 87324 CLOSTRIDIUM AG IA: CPT

## 2020-03-30 PROCEDURE — 85651 RBC SED RATE NONAUTOMATED: CPT

## 2020-03-30 PROCEDURE — 2060000000 HC ICU INTERMEDIATE R&B

## 2020-03-30 RX ORDER — POTASSIUM CHLORIDE 20 MEQ/1
40 TABLET, EXTENDED RELEASE ORAL ONCE
Status: COMPLETED | OUTPATIENT
Start: 2020-03-30 | End: 2020-03-30

## 2020-03-30 RX ORDER — AZITHROMYCIN 250 MG/1
250 TABLET, FILM COATED ORAL DAILY
Status: DISCONTINUED | OUTPATIENT
Start: 2020-03-30 | End: 2020-03-31

## 2020-03-30 RX ADMIN — HYDROXYCHLOROQUINE SULFATE 200 MG: 200 TABLET, FILM COATED ORAL at 09:13

## 2020-03-30 RX ADMIN — CHOLECALCIFEROL TAB 10 MCG (400 UNIT) 800 UNITS: 10 TAB at 09:13

## 2020-03-30 RX ADMIN — RIBAVIRIN 1200 MG: 200 CAPSULE ORAL at 12:15

## 2020-03-30 RX ADMIN — Medication 10 ML: at 09:13

## 2020-03-30 RX ADMIN — LEVOTHYROXINE SODIUM 75 MCG: 75 TABLET ORAL at 06:58

## 2020-03-30 RX ADMIN — Medication 10 ML: at 20:25

## 2020-03-30 RX ADMIN — RIBAVIRIN 1200 MG: 200 CAPSULE ORAL at 04:45

## 2020-03-30 RX ADMIN — ACETAMINOPHEN 650 MG: 325 TABLET ORAL at 11:18

## 2020-03-30 RX ADMIN — AZITHROMYCIN MONOHYDRATE 250 MG: 250 TABLET ORAL at 20:24

## 2020-03-30 RX ADMIN — DARUNAVIR ETHANOLATE AND COBICISTAT 1 EACH: 800; 150 TABLET, FILM COATED ORAL at 18:25

## 2020-03-30 RX ADMIN — HYDROXYCHLOROQUINE SULFATE 200 MG: 200 TABLET, FILM COATED ORAL at 20:24

## 2020-03-30 RX ADMIN — BENZONATATE 100 MG: 100 CAPSULE ORAL at 20:25

## 2020-03-30 RX ADMIN — Medication 1 TABLET: at 09:13

## 2020-03-30 RX ADMIN — ENOXAPARIN SODIUM 40 MG: 40 INJECTION SUBCUTANEOUS at 09:13

## 2020-03-30 RX ADMIN — POTASSIUM CHLORIDE 40 MEQ: 1500 TABLET, EXTENDED RELEASE ORAL at 09:13

## 2020-03-30 RX ADMIN — RIBAVIRIN 1200 MG: 200 CAPSULE ORAL at 20:24

## 2020-03-30 ASSESSMENT — PAIN SCALES - GENERAL
PAINLEVEL_OUTOF10: 0

## 2020-03-30 ASSESSMENT — PAIN SCALES - WONG BAKER: WONGBAKER_NUMERICALRESPONSE: 0

## 2020-03-30 NOTE — PLAN OF CARE
Problem: Falls - Risk of:  Goal: Will remain free from falls  Description: Will remain free from falls  Outcome: Ongoing     Problem: Pain:  Goal: Pain level will decrease  Description: Pain level will decrease  Outcome: Ongoing

## 2020-03-30 NOTE — CONSULTS
Critical Care Admit/Consult Note         Patient - Nan Garza   MRN -  [id number]   [facility] # - [de-identified]   - 1945      Date of Admission -  3/19/2020  6:49 PM  Date of evaluation -  3/30/2020  0204/0204-A   Hospital Day - 11            ADMIT/CONSULT DETAILS     Reason for Admit/Consult   Acute respiratory distress with hypoxia     Carlos Terrazas MD  Primary Care Physician - Vianey Sams MD         HPI     Nan Garza is a 75 y.o. female with a past medical history of hypothyroidism who presented to the ED with cough, shortness of breath and weakness for about a week and a half. She had a cough with occasional productive sputum. She had a fever one time. She was on 2L oxygen at the time of admission on 3/19. She had not had any sick contacts and had been practicing social distancing. Initially admitted on only 2 L nasal cannula. Became more hypoxic requiring high flow oxygen up to 8 L. She was then transferred to the ICU due to worsening hypoxia and respiratory distress for further monitoring in case she would need to be intubated. Overnight her oxygen requirements have actually decreased from 8 L to 7 L. She is awake and alert. No tachypnea. Previously grew coag negative staph bacteremia however this is felt to be contaminant. She also is having some diarrhea and a C. difficile is pending. Past Medical History   History reviewed. No pertinent past medical history. Past Surgical History     No past surgical history on file. SOCIAL AND OCCUPATIONAL HEALTH: Patient has never smoked cigarettes. No alcohol use. Lives at home. No history of illicit drug use or IV drug use.       Influenza:  overdue  Pneumococcal Polysaccharide:  overdue                Current Medications   Current Medications    potassium chloride  40 mEq Oral Once    darunavir-cobicistat  1 tablet Oral Daily    ribavirin  1,200 mg Oral Q8H    azithromycin  500 mg Respiratory  Assessments  Pulse: 94  Resp: (!) 36  SpO2: 92 %  Oral Care: Teeth brushed    ABG  Lab Results   Component Value Date    PH 7.598 2020    O2SAT 93.5 2020     No results found for: IFIO2, MODE, SETTIDVOL, SETPEEP        Vitals    height is 5' 3\" (1.6 m) and weight is 135 lb (61.2 kg). Her axillary temperature is 100.1 °F (37.8 °C). Her blood pressure is 127/72 and her pulse is 94. Her respiration is 36 (abnormal) and oxygen saturation is 92%. Temperature Range: Temp: 100.1 °F (37.8 °C) Temp  Av.1 °F (37.3 °C)  Min: 97.6 °F (36.4 °C)  Max: 100.1 °F (37.8 °C)  BP Range:  Systolic (78CAI), OTX:610 , Min:110 , EOK:193     Diastolic (51FQN), VOH:86, Min:56, Max:72    Pulse Range: Pulse  Av.5  Min: 82  Max: 94  Respiration Range: Resp  Av.6  Min: 16  Max: 36  Current Pulse Ox[de-identified]  SpO2: 92 %  24HR Pulse Ox Range:  SpO2  Av.8 %  Min: 91 %  Max: 98 %  Oxygen Amount and Delivery: O2 Flow Rate (L/min): 7 L/min      I/O (24 Hours)    Patient Vitals for the past 8 hrs:   BP Temp Temp src Pulse Resp SpO2   20 0600 127/72 -- -- 94 (!) 36 92 %   20 0500 127/72 -- -- 92 (!) 31 94 %   20 0400 127/72 100.1 °F (37.8 °C) Axillary 89 29 94 %   20 0300 -- -- -- 87 25 96 %   20 0200 (!) 114/56 -- -- 87 (!) 32 96 %   20 0100 (!) 114/56 -- -- 82 16 98 %       Intake/Output Summary (Last 24 hours) at 3/30/2020 0853  Last data filed at 3/30/2020 0600  Gross per 24 hour   Intake --   Output 705 ml   Net -705 ml     I/O last 3 completed shifts:  In: -   Out: 705 [Urine:705]   Date 20 - 20   Shift 6795-7877 6500-1488 5324-7039 24 Hour Total   INTAKE   Shift Total(mL/kg)       OUTPUT   Urine(mL/kg/hr) 445(0.9)   445   Shift Total(mL/kg) 445(7.3)   445(7.3)   Weight (kg) 61.2 61.2 61.2 61.2     No data found.       Drains/Tubes Outputs  Tran catheter placed 2020  Exam         PHYSICAL EXAM:    General appearance - alert, well appearing, and in no distress and oriented to person, place, and time. Mental status - alert, oriented to person, place, and time, normal mood, behavior, speech, dress, motor activity, and thought processes  Eyes - pupils equal and reactive, extraocular eye movements intact, sclera anicteric  Ears - external ear normal  Nose - normal and patent, no erythema, discharge or polyps  Mouth - mucous membranes moist, pharynx normal without lesions  Neck - supple, no significant adenopathy  Chest -coarse breath sounds, decreased throughout, no wheezes, rales or rhonchi, symmetric air entry. Speaking in full sentences on 7 L high flow nasal cannula. Heart - normal rate, regular rhythm, normal S1, S2, no murmurs, rubs, clicks or gallops  Abdomen - soft, nontender, nondistended, no masses or organomegaly  Neurological - alert, oriented, normal speech, no focal findings or movement disorder noted  Extremities - peripheral pulses normal, no clubbing or cyanosis. No edema.   Skin - normal coloration and turgor, no rashes, no suspicious skin lesions noted     Data   Old records and images have been reviewed    Lab Results   CBC     Lab Results   Component Value Date    WBC 11.1 03/30/2020    RBC 4.00 03/30/2020    HGB 11.6 03/30/2020    HCT 35.0 03/30/2020     03/30/2020    MCV 87.5 03/30/2020    MCH 29.0 03/30/2020    MCHC 33.1 03/30/2020    RDW 13.2 03/30/2020    LYMPHOPCT 8.3 03/27/2020    MONOPCT 10.4 03/27/2020    BASOPCT 0.2 03/27/2020    MONOSABS 1.10 03/27/2020    LYMPHSABS 0.87 03/27/2020    EOSABS 0.05 03/27/2020    BASOSABS 0.02 03/27/2020       BMP   Lab Results   Component Value Date     03/30/2020    K 3.5 03/30/2020    CL 98 03/30/2020    CO2 23 03/30/2020    BUN 10 03/30/2020    CREATININE 0.7 03/30/2020    GLUCOSE 91 03/30/2020    CALCIUM 8.7 03/30/2020       LFTS  Lab Results   Component Value Date    ALKPHOS 137 03/30/2020    ALT 28 03/30/2020    AST 27 03/30/2020    PROT 7.4 03/30/2020 BILITOT 0.8 2020    LABALBU 2.8 2020       INR  No results for input(s): PROTIME, INR in the last 72 hours. APTT  No results for input(s): APTT in the last 72 hours. Lactic Acid  Lab Results   Component Value Date    LACTA 1.7 2020        BNP   No results for input(s): BNP in the last 72 hours. Cultures     No results for input(s): BC in the last 72 hours. No results for input(s): Karmen Hutchins in the last 72 hours. No results for input(s): LABURIN in the last 72 hours. Radiology   Ct Chest Wo Contrast    Result Date: 3/19/2020  Patient Name:  Cyndee Rahman Patient MRN:  [id number] Patient :  1945 Patient Age:  75 years Patient Gender:  Female Order Date:3/19/2020 9:00 PM EXAM:  CT CHEST WO CONTRAST NUMBER OF IMAGES:  565. INDICATION:   cough COMPARISON: None. Technique: Low-dose CT  acquisition technique included one of following options; 1 . Automated exposure control, 2. Adjustment of MA and or KV according to patient's size or 3. Use of iterative reconstruction. Multiple axial images were obtained from the apices of the lungs through the lung bases. Sagittal and coronal reconstructions performed for aid in interpretation of the study. Negrito Hoyles FINDINGS: The trachea is midline and patent. The thyroid gland is unremarkable. Patchy groundglass opacities are seen within the bilateral upper and lower lobes with associated interlobular septal thickening, consistent with crazy paving. Areas of denser consolidation are seen within the right lower lobe, medially with associated air bronchograms. Allowing for non-gating the heart and great vessels are within normal limits. Mild multilevel spondylosis of the thoracic spine. The visualized abdomen is unremarkable. 1. Patchy areas of groundglass opacity with interlobular septal thickening seen within the bilateral upper and to a lesser extent lower lobes, consistent with multilobar pneumonia.       Xr Chest Portable    Result Date: 3/19/2020  Patient MRN: [id number] : 1945 Age:  75 years Gender: Female Order Date: 3/19/2020 8:00 PM Exam: XR CHEST PORTABLE Number of Images: 1 view. Indication:   cough Comparison: None. FINDINGS: Patchy groundglass opacities within the right and left mid lung and left lung base, demonstrating peripheral predominance. Blunting of the left costophrenic angle. The cardiac silhouette is within normal limits. The visualized osseous structures are normal. The visualized abdomen is within normal limits. Findings consistent with multilobar pneumonia of the bilateral midlung zones and left lung base. Blunting of the left costophrenic angle, suggests small left pleural effusion. SYSTEMS ASSESSMENT    Neuro   Awake, alert  No acute concerns    Respiratory   Acute respiratory distress with hypoxia  Pneumonia  COVID r/o test sent 2020  Transferred to the ICU after she began to become hypoxic and requiring increased hyponasal cannula  Currently on 7 L high flow nasal cannula, down from 8 L previously  Azitithromycin/HCQ  Wean oxygen as tolerated. Keep O2 sat 90-92%    Cardiovascular   Hemodynamically stable  Sinus rhythm  Monitor qtc    Gastrointestinal   General diet  Diarrhea  c dif pending    Renal   Hypokalemia - resolved  Replete electrolytes as needed  Daily metabolic panel while in icu     Infectious Disease   Possible COVID 19 - results pending from test sent 2020  Viral pna  Continues to have low grade fevers  Previous coag negative staph bacteremia from  that is likely contaminant  Diarrhea-C. difficile pending  Infectious disease following  Azithromycin and hydroxychloroquine  Prezcobix  Ribavirin  covid droplet/isolation    Hematology/Oncology   H/h stable  Trend cbc    Endocrine   Daily blood glucose    Social/Spiritual/DNR/Other   Full code    Jesse Engel DO, PGY1    \"Thank you for asking us to see this complex patient. \"    I personally saw, examined and provided care for the patient. Radiographs, labs and medication list were reviewed by me independently. I spoke with bedside nursing, therapists and consultants. Critical care services and times documented are independent of procedures and multidisciplinary rounds with Residents. Additionally comprehensive, multidisciplinary rounds were conducted with the MICU team. The case was discussed in detail and plans for care were established. Review of Residents documentation was conducted and revisions were made as appropriate.  I agree with the above documented exam, problem list and plan of care with the following additions:    Doing better  Comfortable on 6L NC (high flow sat 94%, regular canula 91%)  Breathing is not labored  Antimicrobials as per ID  Diet  DVT prophylaxis  May transfer out of ICU    Electronically signed by Eleni Fernández MD on 3/30/2020 at 1:14 PM

## 2020-03-30 NOTE — PLAN OF CARE
Problem: Falls - Risk of:  Goal: Will remain free from falls  Description: Will remain free from falls  Outcome: Met This Shift  Goal: Absence of physical injury  Description: Absence of physical injury  3/30/2020 0055 by Twila Fisher RN  Outcome: Met This Shift  3/30/2020 0055 by Twila Fisher RN  Outcome: Met This Shift     Problem: Pain:  Goal: Pain level will decrease  Description: Pain level will decrease  3/30/2020 0055 by Twila Fisher RN  Outcome: Met This Shift  3/30/2020 0055 by Twila Fisher RN  Outcome: Met This Shift  Goal: Control of acute pain  Description: Control of acute pain  3/30/2020 0055 by Twila Fisher RN  Outcome: Met This Shift  3/30/2020 0055 by Twila Fisher RN  Outcome: Met This Shift  Goal: Control of chronic pain  Description: Control of chronic pain  3/30/2020 0055 by Twila Fisher RN  Outcome: Met This Shift  3/30/2020 0055 by Twila Fisher RN  Outcome: Met This Shift     Problem: Physical Regulation:  Goal: Ability to maintain a body temperature in the normal range will improve  Description: Ability to maintain a body temperature in the normal range will improve  Outcome: Met This Shift  Goal: Ability to maintain vital signs within normal range will improve  Description: Ability to maintain vital signs within normal range will improve  Outcome: Met This Shift     Problem: Gas Exchange - Impaired:  Goal: Levels of oxygenation will improve  Description: Levels of oxygenation will improve  Outcome: Met This Shift     Problem: Breathing Pattern - Ineffective:  Goal: Ability to achieve and maintain a regular respiratory rate will improve  Description: Ability to achieve and maintain a regular respiratory rate will improve  Outcome: Met This Shift

## 2020-03-30 NOTE — PROGRESS NOTES
[address] [place] [address] [facility]  ANGEL  Progress Note    SUBJECTIVE:  Chief Complaint   Patient presents with    Fever     highest of 101.0    Fatigue     fatigue/weakness x 1 week    Cough     occasional dry cough     Events noted. The patient was requiring 8 L oxygen and was saturating at 92% so she is travel to the ICU last night. The patient feels about the same. She does not feel dyspneic and her only complaint is having a stiff neck from the bed that she is in currently. She still having some loose stools. Review of systems:  As stated above in the chief complaint, otherwise negative. Medications:  Scheduled Meds:   darunavir-cobicistat  1 tablet Oral Daily    ribavirin  1,200 mg Oral Q8H    azithromycin  500 mg Oral Daily    hydroxychloroquine  200 mg Oral BID    levothyroxine  75 mcg Oral Daily    therapeutic multivitamin-minerals  1 tablet Oral Daily    vitamin D3  800 Units Oral Daily    sodium chloride flush  10 mL Intravenous 2 times per day    enoxaparin  40 mg Subcutaneous Daily     Continuous Infusions:  PRN Meds:artificial tears, benzonatate, polyvinyl alcohol, sodium chloride flush, acetaminophen **OR** acetaminophen, polyethylene glycol, promethazine **OR** ondansetron    OBJECTIVE:  /72   Pulse 94   Temp 100.1 °F (37.8 °C) (Axillary)   Resp (!) 36   Ht 5' 3\" (1.6 m)   Wt 135 lb (61.2 kg)   SpO2 92%   BMI 23.91 kg/m²   Temp  Av.1 °F (37.3 °C)  Min: 97.6 °F (36.4 °C)  Max: 100.1 °F (37.8 °C)  Constitutional: P the patient is sitting in bed. She is awake, alert and in no distress. She looks no different than she did yesterday. Skin: Warm and dry. No rashes were noted. HEENT: Round and reactive pupils. Moist mucous membranes. No ulcerations or thrush. Neck: Supple to movements. She exhibits some slight resistant to flexing her neck and cannot rotate her left or right without having pain. Otherwise unremarkable.   No associated to the above, improving  · CoNS bacteremia. Contaminant  · Diarrhea secondary to COVID19 versus antibiotic associated diarrhe C. difficile infection ruled out a. Possible C. difficile    PLAN:  · Continue oral Azithromycin  · Continue Hydroxychloroquine  · Continue protease inhibitor Prezcobix (Darunavir plus cobicistat) and Ribavirin  · Continue droplet/contact isolation.   Patient is not in a negative pressure room    Chon Rainey  8:06 AM  3/30/2020

## 2020-03-30 NOTE — ADT AUTH CERT
Utilization Reviews         COVID19 by Juventino Louis RN         Review Status Review Entered   In Primary 3/30/2020 11:30       Criteria Review   The illness suspected to be related to the Coronavirus (COVID-19)? Yes  Has the member been tested for the COVID-19? Yes  If Yes, what are the results of the COVID-19? Pending  What is the severity of the members condition (Isolation)          Pneumonia - Care Day 9 (3/27/2020) by Juventino Louis RN         Review Status Review Entered   Completed 3/30/2020 11:23       Criteria Review      Care Day: 9 Care Date: 3/27/2020 Level of Care: Telemetry    Guideline Day 2    Clinical Status    ( ) * No CO2 retention or acidosis    (X) * No requirement for mechanical ventilation    ( ) * Hypotension absent    (X) * Fever absent or reduced    ( ) * No hypoxia on room air or oxygenation improved    ( ) * Mental status improved or at baseline    Activity    (X) * Increased activity    Routes    (X) Oral hydration, medications    (X) Usual diet    Interventions    (X) Incentive spirometry    (X) Pulse oximetry    (X) Head of bed at 30 degrees    (X) Possible oxygen    Medications    (X) IV or oral antibiotics    * Milestone   Additional Notes   3-      /62   Pulse 93   Temp 97.6 °F (36.4 °C) (Oral)   Resp 20   Ht 5' 3\" (1.6 m)   Wt 135 lb (61.2 kg)   ** ** ** SpO2 91% 6 Lpm NC   BMI 23.91 kg/m²          3/27/2020 20:20   Potassium: 3.1 (L)   Chloride: 94 (L)   Glucose: 105 (H)         C. Difficile Toxin A and/or B NOT detected       ** ** ** INTERNAL MEDICINE          Pt feels tired and exhausted, still having cough and shortness of breath      Pulmonary/Chest: Crackles bilaterally- no wheezes, mild respiratory distress      Plan:   1.  Acute hypoxic respiratory failure, worsening oxygenation, will need intubation.  She is full code.     2.  Acute viral pneumonia, respiratory panel came back positive for staph,  Obtain sputum cultures, consult ID, continue azithro and plaquenil day 9,    3. Viral pneumonia, pending COVID-19 started on plaquenil and azithro   3.  Bacteremia coags - staph, most likely contaminated, received a dose of vanc, appreciate ID input, stop vanc    4.  Hypothyroidism, continue levothyroxine.          ** ** ** INFECTIOUS DISEASE       Microbiology:    COVID19 NP swab by PCR: Pending   Blood cultures 3/19/2020 CoNS in 1 of 4 bottles   Respiratory culture: Ordered but not sent   Urine culture 3/20/2020 <10,000 GPO   Insert urine antigens   Respiratory panel: Negative   Rapid influenza: Negative   Procalcitonin: 0.13       ASSESSMENT:   · Possible SARS COV 2/COVID19 infection   · Probable viral pneumonia.  Stable O2 requirements have not increased   · Fever associated to the above, improving   · CoNS bacteremia.  Contaminant       PLAN:   · Continue oral Azithromycin   · Continue Hydroxychloroquine   · Continue protease inhibitor Prezcobix (Darunavir plus cobicistat) and Ribavirin   · Continue droplet/contact isolation.  Patient is not in a negative pressure room         Scheduled Medications   · darunavir-cobicistat 1 tablet Oral Daily   · ribavirin 1,200 mg Oral Q8H   · azithromycin 500 mg Oral Daily   · hydroxychloroquine 200 mg Oral BID   · levothyroxine 75 mcg Oral Daily   · therapeutic multivitamin-minerals 1 tablet Oral Daily   · vitamin D3 800 Units Oral Daily   · enoxaparin 40 mg Subcutaneous Daily         PRN:   Tessalon 100mg X 3 doses

## 2020-03-31 LAB
ANION GAP SERPL CALCULATED.3IONS-SCNC: 14 MMOL/L (ref 7–16)
BUN BLDV-MCNC: 12 MG/DL (ref 8–23)
CALCIUM SERPL-MCNC: 8.8 MG/DL (ref 8.6–10.2)
CHLORIDE BLD-SCNC: 100 MMOL/L (ref 98–107)
CO2: 20 MMOL/L (ref 22–29)
CREAT SERPL-MCNC: 0.7 MG/DL (ref 0.5–1)
GFR AFRICAN AMERICAN: >60
GFR NON-AFRICAN AMERICAN: >60 ML/MIN/1.73
GLUCOSE BLD-MCNC: 100 MG/DL (ref 74–99)
HCT VFR BLD CALC: 34 % (ref 34–48)
HEMOGLOBIN: 11.2 G/DL (ref 11.5–15.5)
MAGNESIUM: 2.3 MG/DL (ref 1.6–2.6)
MCH RBC QN AUTO: 29.2 PG (ref 26–35)
MCHC RBC AUTO-ENTMCNC: 32.9 % (ref 32–34.5)
MCV RBC AUTO: 88.5 FL (ref 80–99.9)
MRSA CULTURE ONLY: NORMAL
PDW BLD-RTO: 13.3 FL (ref 11.5–15)
PLATELET # BLD: 505 E9/L (ref 130–450)
PMV BLD AUTO: 9.7 FL (ref 7–12)
POTASSIUM REFLEX MAGNESIUM: 3.5 MMOL/L (ref 3.5–5)
RBC # BLD: 3.84 E12/L (ref 3.5–5.5)
SODIUM BLD-SCNC: 134 MMOL/L (ref 132–146)
WBC # BLD: 11.7 E9/L (ref 4.5–11.5)

## 2020-03-31 PROCEDURE — 80048 BASIC METABOLIC PNL TOTAL CA: CPT

## 2020-03-31 PROCEDURE — 2060000000 HC ICU INTERMEDIATE R&B

## 2020-03-31 PROCEDURE — 6370000000 HC RX 637 (ALT 250 FOR IP): Performed by: SPECIALIST

## 2020-03-31 PROCEDURE — 99233 SBSQ HOSP IP/OBS HIGH 50: CPT | Performed by: INTERNAL MEDICINE

## 2020-03-31 PROCEDURE — 2700000000 HC OXYGEN THERAPY PER DAY

## 2020-03-31 PROCEDURE — 85027 COMPLETE CBC AUTOMATED: CPT

## 2020-03-31 PROCEDURE — 83735 ASSAY OF MAGNESIUM: CPT

## 2020-03-31 PROCEDURE — 36415 COLL VENOUS BLD VENIPUNCTURE: CPT

## 2020-03-31 PROCEDURE — 6370000000 HC RX 637 (ALT 250 FOR IP): Performed by: INTERNAL MEDICINE

## 2020-03-31 PROCEDURE — 2580000003 HC RX 258: Performed by: INTERNAL MEDICINE

## 2020-03-31 PROCEDURE — 6360000002 HC RX W HCPCS: Performed by: INTERNAL MEDICINE

## 2020-03-31 RX ADMIN — Medication 10 ML: at 08:55

## 2020-03-31 RX ADMIN — ACETAMINOPHEN 650 MG: 325 TABLET ORAL at 10:35

## 2020-03-31 RX ADMIN — BENZONATATE 100 MG: 100 CAPSULE ORAL at 17:04

## 2020-03-31 RX ADMIN — HYDROXYCHLOROQUINE SULFATE 200 MG: 200 TABLET, FILM COATED ORAL at 21:57

## 2020-03-31 RX ADMIN — DARUNAVIR ETHANOLATE AND COBICISTAT 1 EACH: 800; 150 TABLET, FILM COATED ORAL at 17:04

## 2020-03-31 RX ADMIN — RIBAVIRIN 1200 MG: 200 CAPSULE ORAL at 04:12

## 2020-03-31 RX ADMIN — RIBAVIRIN 1200 MG: 200 CAPSULE ORAL at 12:45

## 2020-03-31 RX ADMIN — HYDROXYCHLOROQUINE SULFATE 200 MG: 200 TABLET, FILM COATED ORAL at 08:55

## 2020-03-31 RX ADMIN — Medication 10 ML: at 22:02

## 2020-03-31 RX ADMIN — Medication 1 TABLET: at 08:55

## 2020-03-31 RX ADMIN — ACETAMINOPHEN 650 MG: 325 TABLET ORAL at 19:55

## 2020-03-31 RX ADMIN — ENOXAPARIN SODIUM 40 MG: 40 INJECTION SUBCUTANEOUS at 08:55

## 2020-03-31 RX ADMIN — BENZONATATE 100 MG: 100 CAPSULE ORAL at 08:57

## 2020-03-31 RX ADMIN — CHOLECALCIFEROL TAB 10 MCG (400 UNIT) 800 UNITS: 10 TAB at 08:55

## 2020-03-31 RX ADMIN — RIBAVIRIN 1200 MG: 200 CAPSULE ORAL at 21:56

## 2020-03-31 RX ADMIN — LEVOTHYROXINE SODIUM 75 MCG: 75 TABLET ORAL at 05:46

## 2020-03-31 ASSESSMENT — PAIN SCALES - GENERAL
PAINLEVEL_OUTOF10: 0
PAINLEVEL_OUTOF10: 0

## 2020-03-31 NOTE — PROGRESS NOTES
[address] [place] [address] [facility]  [facility]  Progress Note    SUBJECTIVE:  Chief Complaint   Patient presents with    Fever     highest of 101.0    Fatigue     fatigue/weakness x 1 week    Cough     occasional dry cough     The patient was moved out of the ICU. She feels about the same. She still has some loose stools. Minimal cough. Tolerating medications. The neck pain has improved. Review of systems:  As stated above in the chief complaint, otherwise negative. Medications:  Scheduled Meds:   azithromycin  250 mg Oral Daily    darunavir-cobicistat  1 tablet Oral Daily    ribavirin  1,200 mg Oral Q8H    hydroxychloroquine  200 mg Oral BID    levothyroxine  75 mcg Oral Daily    therapeutic multivitamin-minerals  1 tablet Oral Daily    vitamin D3  800 Units Oral Daily    sodium chloride flush  10 mL Intravenous 2 times per day    enoxaparin  40 mg Subcutaneous Daily     Continuous Infusions:  PRN Meds:artificial tears, benzonatate, polyvinyl alcohol, sodium chloride flush, acetaminophen **OR** acetaminophen, polyethylene glycol, promethazine **OR** ondansetron    OBJECTIVE:  /66   Pulse 89   Temp 98.5 °F (36.9 °C) (Oral)   Resp 26   Ht 5' 3\" (1.6 m)   Wt 135 lb (61.2 kg)   SpO2 91%   BMI 23.91 kg/m²   Temp  Av.7 °F (37.1 °C)  Min: 98 °F (36.7 °C)  Max: 99.7 °F (37.6 °C)  Constitutional: The patient is sitting in bed. She is awake and alert. Oxygen by nasal cannula at 7 L. Skin: Warm and dry. No rashes were noted. HEENT: Round and reactive pupils. Moist mucous membranes. No ulcerations or thrush. Neck: Supple to movements. Chest: Crackles on lateral lung fields. Cardiovascular: Heart sounds rhythmic and regular. Abdomen: Positive bowel sounds to auscultation. Benign to palpation. Extremities: No edema.   Lines: peripheral    Laboratory and Tests Review:  Lab Results   Component Value Date    WBC 11.7 (H) 2020    WBC 11.1 2020    WBC 10.5 03/27/2020    HGB 11.2 (L) 03/31/2020    HCT 34.0 03/31/2020    MCV 88.5 03/31/2020     (H) 03/31/2020     Lab Results   Component Value Date    NEUTROABS 8.44 (H) 03/27/2020    NEUTROABS 5.50 03/24/2020    NEUTROABS 7.45 (H) 03/20/2020     No results found for: CRPHS  Lab Results   Component Value Date    ALT 28 03/30/2020    AST 27 03/30/2020    ALKPHOS 137 (H) 03/30/2020    BILITOT 0.8 03/30/2020     Lab Results   Component Value Date     03/31/2020    K 3.5 03/31/2020     03/31/2020    CO2 20 03/31/2020    BUN 12 03/31/2020    CREATININE 0.7 03/31/2020    CREATININE 0.7 03/30/2020    CREATININE 0.8 03/27/2020    GFRAA >60 03/31/2020    LABGLOM >60 03/31/2020    GLUCOSE 100 03/31/2020    PROT 7.4 03/30/2020    LABALBU 2.8 03/30/2020    CALCIUM 8.8 03/31/2020    BILITOT 0.8 03/30/2020    ALKPHOS 137 03/30/2020    AST 27 03/30/2020    ALT 28 03/30/2020     Lab Results   Component Value Date    CRP 27.6 (H) 03/30/2020    CRP 21.2 (H) 03/24/2020    CRP 27.2 (H) 03/21/2020     Lab Results   Component Value Date    SEDRATE 110 (H) 03/30/2020    SEDRATE 90 (H) 03/24/2020    SEDRATE 65 (H) 03/21/2020     Radiology:  No new imaging studies    Microbiology:   COVID19 NP swab by PCR (Sent to [facility]): Pending  Blood cultures 3/19/2020 CoNS in 1 of 4 bottles  Respiratory culture: Ordered but not sent  Urine culture 3/20/2020 <10,000 GPO  Insert urine antigens  Respiratory panel: Negative  Rapid influenza: Negative  Procalcitonin: 0.13    ASSESSMENT:  · Possible SARS COV 2/COVID19 infection. Still awaiting for results. · Probable viral pneumonia. Stable O2 requirements have not increased  · Fever associated to the above, improving  · CoNS bacteremia. Contaminant  · Diarrhea secondary to COVID19 versus antibiotic associated diarrhe C. difficile infection ruled out a.   Possible C. difficile    PLAN:  · Stop oral Azithromycin  · Continue Hydroxychloroquine  · Continue protease inhibitor Prezcobix (Darunavir plus cobicistat) and Ribavirin  · Continue droplet/contact isolation.   Patient is not in a negative pressure room    Woody Aranda  3:45 PM  3/31/2020

## 2020-03-31 NOTE — PLAN OF CARE
Problem: Falls - Risk of:  Goal: Will remain free from falls  Description: Will remain free from falls  3/31/2020 0933 by Bi Mckoy RN  Outcome: Met This Shift     Problem: Pain:  Goal: Control of acute pain  Description: Control of acute pain  Outcome: Met This Shift     Problem: Gas Exchange - Impaired:  Goal: Levels of oxygenation will improve  Description: Levels of oxygenation will improve  3/31/2020 0933 by Bi Mckoy RN  Outcome: Ongoing

## 2020-03-31 NOTE — PROGRESS NOTES
Labs:  Lab Results   Component Value Date    WBC 11.7 03/31/2020    HGB 11.2 03/31/2020    HCT 34.0 03/31/2020    MCV 88.5 03/31/2020    MCH 29.2 03/31/2020    MCHC 32.9 03/31/2020    RDW 13.3 03/31/2020     03/31/2020    MPV 9.7 03/31/2020     Lab Results   Component Value Date     03/31/2020    K 3.5 03/31/2020     03/31/2020    CO2 20 03/31/2020    BUN 12 03/31/2020    CREATININE 0.7 03/31/2020    LABALBU 2.8 03/30/2020    CALCIUM 8.8 03/31/2020    GFRAA >60 03/31/2020    LABGLOM >60 03/31/2020     No results found for: PROTIME, INR  No results for input(s): PROBNP in the last 72 hours. No results for input(s): PROCAL in the last 72 hours. This SmartLink has not been configured with any valid records. Micro:  No results for input(s): CULTRESP in the last 72 hours. No results for input(s): LABGRAM in the last 72 hours. No results for input(s): LEGUR in the last 72 hours. No results for input(s): STREPNEUMAGU in the last 72 hours. No results for input(s): LP1UAG in the last 72 hours. Assessment:    1. Acute respiratory failure with hypoxia  2. Pneumonia  3. Possible COVID 19 infection  4. Diarrhea      Plan:   1. Oxygen therapy 8 L high flow nasal cannula  2. Continue azithromycin and Plaquenil per ID service  3. Await results of COVID testing  4. No nebulizers or NSAIDs  5. Stool for c diff pending  6. Dvt, GI prophylaxis   This plan of care was reviewed in collaboration with Dr. Maye Jones  Electronically signed by JED Dhillon CNP on 3/31/2020 at 3:30 PM    I personally saw, examined, and cared for the patient. Labs, medications, radiographs reviewed. I agree with history exam and plans detailed in NP note. Roxana Dupont M.D.    Pulmonary/Critical Care Medicine

## 2020-03-31 NOTE — PROGRESS NOTES
[facility] Progress Note    Admitting Date and Time: 3/19/2020  6:49 PM  Admit Dx: Pneumonia [J18.9]  Pneumonia [J18.9]    Subjective:  Patient is being followed for Pneumonia [J18.9]  Pneumonia [J18.9]     The patient states that she is breathing better, and she denies any nausea, vomiting, fevers, or chills. She states that diarrhea is her only issue.      darunavir-cobicistat  1 tablet Oral Daily    ribavirin  1,200 mg Oral Q8H    hydroxychloroquine  200 mg Oral BID    levothyroxine  75 mcg Oral Daily    therapeutic multivitamin-minerals  1 tablet Oral Daily    vitamin D3  800 Units Oral Daily    sodium chloride flush  10 mL Intravenous 2 times per day    enoxaparin  40 mg Subcutaneous Daily     artificial tears, , PRN  benzonatate, 100 mg, TID PRN  polyvinyl alcohol, 1 drop, PRN  sodium chloride flush, 10 mL, PRN  acetaminophen, 650 mg, Q6H PRN    Or  acetaminophen, 650 mg, Q6H PRN  polyethylene glycol, 17 g, Daily PRN  promethazine, 12.5 mg, Q6H PRN    Or  ondansetron, 4 mg, Q6H PRN         Objective:    /66   Pulse 89   Temp 98.5 °F (36.9 °C) (Oral)   Resp 26   Ht 5' 3\" (1.6 m)   Wt 135 lb (61.2 kg)   SpO2 91%   BMI 23.91 kg/m²     Gen: NAD, AAOx3, sitting in bed  HEENT: NCAT  CV: NSR, HR 89  Resp: Equal chest rise b/l, non-labored breathing; saturating 93% on 7L N/C  Abd: Non-obese  Ext: Good ROM of b/l upper and lower extremities    Recent Labs     03/30/20  0600 03/31/20  1250    134   K 3.5 3.5   CL 98 100   CO2 23 20*   BUN 10 12   CREATININE 0.7 0.7   GLUCOSE 91 100*   CALCIUM 8.7 8.8       Recent Labs     03/30/20  0600 03/31/20  0220   WBC 11.1 11.7*   RBC 4.00 3.84   HGB 11.6 11.2*   HCT 35.0 34.0   MCV 87.5 88.5   MCH 29.0 29.2   MCHC 33.1 32.9   RDW 13.2 13.3    505*   MPV 9.6 9.7     Assessment:    Active Problems:    Pneumonia    Acute respiratory failure with hypoxia (HCC)    Staphylococcus aureus bacteremia  Resolved Problems:    * No

## 2020-03-31 NOTE — PLAN OF CARE
Problem: Falls - Risk of:  Goal: Will remain free from falls  Description: Will remain free from falls  3/31/2020 0315 by Brendan Corea RN  Outcome: Met This Shift     Problem: Pain:  Goal: Pain level will decrease  Description: Pain level will decrease  3/31/2020 0315 by Brendan Corea RN  Outcome: Met This Shift     Problem: Gas Exchange - Impaired:  Goal: Levels of oxygenation will improve  Description: Levels of oxygenation will improve  Outcome: Ongoing

## 2020-03-31 NOTE — PROGRESS NOTES
[facility] Quality Flow/Interdisciplinary Rounds Progress Note        Quality Flow Rounds held on March 31, 2020    Disciplines Attending:  Bedside Nurse,  and Nursing Unit Leadership    Nakia Vila was admitted on 3/19/2020  6:49 PM    Anticipated Discharge Date:  Expected Discharge Date: 04/06/20    Disposition:    Alke Score:  Alek Scale Score: 20    Readmission Risk              Risk of Unplanned Readmission:        9           Discussed patient goal for the day, patient clinical progression, and barriers to discharge. The following Goal(s) of the Day/Commitment(s) have been identified:  await COVID testing, monitor oxygen.       Sherrie Beltran  March 31, 2020

## 2020-04-01 ENCOUNTER — APPOINTMENT (OUTPATIENT)
Dept: GENERAL RADIOLOGY | Age: 75
DRG: 193 | End: 2020-04-01
Payer: MEDICARE

## 2020-04-01 LAB
ANION GAP SERPL CALCULATED.3IONS-SCNC: 15 MMOL/L (ref 7–16)
BUN BLDV-MCNC: 10 MG/DL (ref 8–23)
CALCIUM SERPL-MCNC: 8.7 MG/DL (ref 8.6–10.2)
CHLORIDE BLD-SCNC: 100 MMOL/L (ref 98–107)
CO2: 22 MMOL/L (ref 22–29)
CREAT SERPL-MCNC: 0.7 MG/DL (ref 0.5–1)
GFR AFRICAN AMERICAN: >60
GFR NON-AFRICAN AMERICAN: >60 ML/MIN/1.73
GLUCOSE BLD-MCNC: 100 MG/DL (ref 74–99)
HCT VFR BLD CALC: 32.7 % (ref 34–48)
HEMOGLOBIN: 10.6 G/DL (ref 11.5–15.5)
MCH RBC QN AUTO: 28.7 PG (ref 26–35)
MCHC RBC AUTO-ENTMCNC: 32.4 % (ref 32–34.5)
MCV RBC AUTO: 88.6 FL (ref 80–99.9)
PDW BLD-RTO: 13.3 FL (ref 11.5–15)
PLATELET # BLD: 501 E9/L (ref 130–450)
PMV BLD AUTO: 10 FL (ref 7–12)
POTASSIUM REFLEX MAGNESIUM: 3.6 MMOL/L (ref 3.5–5)
RBC # BLD: 3.69 E12/L (ref 3.5–5.5)
SODIUM BLD-SCNC: 137 MMOL/L (ref 132–146)
WBC # BLD: 11.1 E9/L (ref 4.5–11.5)

## 2020-04-01 PROCEDURE — 36415 COLL VENOUS BLD VENIPUNCTURE: CPT

## 2020-04-01 PROCEDURE — 6370000000 HC RX 637 (ALT 250 FOR IP): Performed by: INTERNAL MEDICINE

## 2020-04-01 PROCEDURE — 97530 THERAPEUTIC ACTIVITIES: CPT

## 2020-04-01 PROCEDURE — 80048 BASIC METABOLIC PNL TOTAL CA: CPT

## 2020-04-01 PROCEDURE — 2060000000 HC ICU INTERMEDIATE R&B

## 2020-04-01 PROCEDURE — 2580000003 HC RX 258: Performed by: INTERNAL MEDICINE

## 2020-04-01 PROCEDURE — 93005 ELECTROCARDIOGRAM TRACING: CPT | Performed by: SPECIALIST

## 2020-04-01 PROCEDURE — 6370000000 HC RX 637 (ALT 250 FOR IP): Performed by: SPECIALIST

## 2020-04-01 PROCEDURE — 99233 SBSQ HOSP IP/OBS HIGH 50: CPT | Performed by: INTERNAL MEDICINE

## 2020-04-01 PROCEDURE — 6370000000 HC RX 637 (ALT 250 FOR IP): Performed by: NURSE PRACTITIONER

## 2020-04-01 PROCEDURE — 2700000000 HC OXYGEN THERAPY PER DAY

## 2020-04-01 PROCEDURE — 97161 PT EVAL LOW COMPLEX 20 MIN: CPT

## 2020-04-01 PROCEDURE — U0002 COVID-19 LAB TEST NON-CDC: HCPCS

## 2020-04-01 PROCEDURE — 85027 COMPLETE CBC AUTOMATED: CPT

## 2020-04-01 PROCEDURE — 6360000002 HC RX W HCPCS: Performed by: INTERNAL MEDICINE

## 2020-04-01 PROCEDURE — 71045 X-RAY EXAM CHEST 1 VIEW: CPT

## 2020-04-01 RX ORDER — GUAIFENESIN AND DEXTROMETHORPHAN HYDROBROMIDE 400; 20 MG/1; MG/1
1 TABLET ORAL EVERY 4 HOURS PRN
Status: DISCONTINUED | OUTPATIENT
Start: 2020-04-01 | End: 2020-04-06 | Stop reason: HOSPADM

## 2020-04-01 RX ORDER — POTASSIUM CHLORIDE 20 MEQ/1
40 TABLET, EXTENDED RELEASE ORAL ONCE
Status: COMPLETED | OUTPATIENT
Start: 2020-04-01 | End: 2020-04-01

## 2020-04-01 RX ADMIN — DARUNAVIR ETHANOLATE AND COBICISTAT 1 EACH: 800; 150 TABLET, FILM COATED ORAL at 16:57

## 2020-04-01 RX ADMIN — DEXTROMETHORPHAN HBR AND GUAIFENESIN 1 TABLET: 20; 400 TABLET, FILM COATED ORAL at 12:24

## 2020-04-01 RX ADMIN — ACETAMINOPHEN 650 MG: 325 TABLET ORAL at 03:43

## 2020-04-01 RX ADMIN — BENZOCAINE AND MENTHOL 1 LOZENGE: 15; 3.6 LOZENGE ORAL at 17:00

## 2020-04-01 RX ADMIN — POTASSIUM CHLORIDE 40 MEQ: 1500 TABLET, EXTENDED RELEASE ORAL at 12:24

## 2020-04-01 RX ADMIN — RIBAVIRIN 1200 MG: 200 CAPSULE ORAL at 12:23

## 2020-04-01 RX ADMIN — LEVOTHYROXINE SODIUM 75 MCG: 75 TABLET ORAL at 06:11

## 2020-04-01 RX ADMIN — RIBAVIRIN 1200 MG: 200 CAPSULE ORAL at 03:35

## 2020-04-01 RX ADMIN — Medication 10 ML: at 21:33

## 2020-04-01 RX ADMIN — Medication 10 ML: at 09:09

## 2020-04-01 RX ADMIN — CHOLECALCIFEROL TAB 10 MCG (400 UNIT) 800 UNITS: 10 TAB at 09:08

## 2020-04-01 RX ADMIN — BENZONATATE 100 MG: 100 CAPSULE ORAL at 17:05

## 2020-04-01 RX ADMIN — ENOXAPARIN SODIUM 40 MG: 40 INJECTION SUBCUTANEOUS at 09:08

## 2020-04-01 RX ADMIN — RIBAVIRIN 1200 MG: 200 CAPSULE ORAL at 21:33

## 2020-04-01 RX ADMIN — BENZONATATE 100 MG: 100 CAPSULE ORAL at 09:09

## 2020-04-01 RX ADMIN — BENZOCAINE AND MENTHOL 1 LOZENGE: 15; 3.6 LOZENGE ORAL at 12:24

## 2020-04-01 RX ADMIN — HYDROXYCHLOROQUINE SULFATE 200 MG: 200 TABLET, FILM COATED ORAL at 09:08

## 2020-04-01 RX ADMIN — Medication 1 TABLET: at 09:08

## 2020-04-01 ASSESSMENT — PAIN SCALES - GENERAL: PAINLEVEL_OUTOF10: 0

## 2020-04-01 NOTE — PROGRESS NOTES
Negar Moreno M.D.,FCCP  Jessica Shelter D.O., F.A.C.O.I., Elan Collado M.D. Deja Robbins M.D., Vani Enrique M.D. Mason Bell D.O. Daily Pulmonary Progress Note    Patient:  Gina Calixto 75 y.o. female MRN: [id number]     Date of Service: 4/1/2020      Synopsis     We are following patient for respiratory distress with hypoxia, moderate Covid 19 pneumonitis    \"CC\" shortness of breath, cough    Code status: Full      Subjective      Patient was seen and examined. Lying in bed in no acute distress. Appears comfortable oxygen remains at 5  L. Saturation at rest 93%. She states that she is feeling better today. Cough present less dyspnea. She has no mucus production. No fever this a.m. Review of Systems:  Constitutional: Denies fever, weight loss, night sweats, and fatigue  Skin: Denies pigmentation, dark lesions, and rashes   HEENT: Denies hearing loss, tinnitus, ear drainage, epistaxis, sore throat, and hoarseness. Cardiovascular: Denies palpitations, chest pain, and chest pressure.   Respiratory: As above  Gastrointestinal: Denies nausea, vomiting, poor appetite, diarrhea, heartburn or reflux  Genitourinary: Denies dysuria, frequency, urgency or hematuria  Musculoskeletal: Denies myalgias, muscle weakness, and bone pain  Neurological: Denies dizziness, vertigo, headache, and focal weakness  Psychological: Denies anxiety and depression  Endocrine: Denies heat intolerance and cold intolerance  Hematopoietic/Lymphatic: Denies bleeding problems and blood transfusions    24-hour events:  None    Objective   Vitals: BP (!) 122/58   Pulse 94   Temp 98.9 °F (37.2 °C)   Resp 26   Ht 5' 3\" (1.6 m)   Wt 135 lb (61.2 kg)   SpO2 92%   BMI 23.91 kg/m²     I/O:    Intake/Output Summary (Last 24 hours) at 4/1/2020 1358  Last data filed at 4/1/2020 1225  Gross per 24 hour   Intake --   Output 1375 ml   Net -1375 ml       Vent Information  FiO2 : 21 % CURRENT MEDS :  Scheduled Meds:   darunavir-cobicistat  1 tablet Oral Daily    ribavirin  1,200 mg Oral Q8H    hydroxychloroquine  200 mg Oral BID    levothyroxine  75 mcg Oral Daily    therapeutic multivitamin-minerals  1 tablet Oral Daily    vitamin D3  800 Units Oral Daily    sodium chloride flush  10 mL Intravenous 2 times per day    enoxaparin  40 mg Subcutaneous Daily       Physical Exam:  General Appearance: appears comfortable in no acute distress. HEENT: Normocephalic atraumatic without obvious abnormality   Neck: Lips, mucosa, and tongue normal.  Supple, symmetrical, trachea midline, no adenopathy;thyroid:  no enlargement/tenderness/nodules or JVD. Lung: Breath sounds few basilar crackles. Respirations   unlabored. Symmetrical expansion. Heart: RRR, normal S1, S2. No MRG  Abdomen: Soft, NT, ND. BS present x 4 quadrants. No bruit or organomegaly. Extremities: Pedal pulses 2+ symmetric b/l. Extremities normal, no cyanosis, clubbing, or edema. Musculokeletal: No joint swelling, no muscle tenderness. ROM normal in all joints of extremities. Neurologic: Mental status: Alert and Oriented X3 . Pertinent/ New Labs and Imaging Studies     Imaging Personally Reviewed:  4/1/2020     The heart is unremarkable. The lung fields demonstrate progression of patchy consolidative   opacities in the mid and lower lung fields bilaterally. The aorta is unremarkable.           Impression   Bilateral patchy consolidative opacities compatible with   multifocal or viral pneumonia, with interval worsening.       The study was dictated by Gianfranco Londono PA-C and Sinai Kapoor MD   reviewed and concurred with the findings.               3/19/2020 CT chest without contrast  Impression       1.  Patchy areas of groundglass opacity with interlobular septal   thickening seen within the bilateral upper and to a lesser extent   lower lobes, consistent with multilobar pneumonia.                 Labs:  Lab Results   Component Value Date    WBC 11.1 04/01/2020    HGB 10.6 04/01/2020    HCT 32.7 04/01/2020    MCV 88.6 04/01/2020    MCH 28.7 04/01/2020    MCHC 32.4 04/01/2020    RDW 13.3 04/01/2020     04/01/2020    MPV 10.0 04/01/2020     Lab Results   Component Value Date     04/01/2020    K 3.6 04/01/2020     04/01/2020    CO2 22 04/01/2020    BUN 10 04/01/2020    CREATININE 0.7 04/01/2020    LABALBU 2.8 03/30/2020    CALCIUM 8.7 04/01/2020    GFRAA >60 04/01/2020    LABGLOM >60 04/01/2020     No results found for: PROTIME, INR  No results for input(s): PROBNP in the last 72 hours. No results for input(s): PROCAL in the last 72 hours. This SmartLink has not been configured with any valid records. Micro:  No results for input(s): CULTRESP in the last 72 hours. No results for input(s): LABGRAM in the last 72 hours. No results for input(s): LEGUR in the last 72 hours. No results for input(s): STREPNEUMAGU in the last 72 hours. No results for input(s): LP1UAG in the last 72 hours. Assessment:    1. Acute respiratory failure with hypoxia  2. Viral Pneumonia  3. Possible COVID 19 infection  4. Diarrhea, c diff negative  5. CONS bacteremia-contaminant      Plan:   1. Oxygen therapy down to 5 L high flow nasal cannula  2. Continue Plaquenil per ID service, off Azithro. Also on protease inhibitor Prezcobix (Darunavir plus cobicistat) and Ribavirin   3. Await results of COVID testing sent to [facility] on 3/20 may need re- swabbed  4. No nebulizers or NSAIDs  5. Stool for c diff negative  6. Chest x-ray today  7. Dvt, GI prophylaxis   This plan of care was reviewed in collaboration with Dr. Corrina Schroeder  Electronically signed by JED Michael CNP on 4/1/2020 at 1:58 PM    I personally saw, examined, and cared for the patient. Labs, medications, radiographs reviewed. I agree with history exam and plans detailed in NP note.     Improving oxygenation slowly   Erik Decker

## 2020-04-01 NOTE — PLAN OF CARE
Problem: Gas Exchange - Impaired:  Goal: Levels of oxygenation will improve  Description: Levels of oxygenation will improve  4/1/2020 0957 by Car Martinez RN  Outcome: Met This Shift     Problem: Breathing Pattern - Ineffective:  Goal: Ability to achieve and maintain a regular respiratory rate will improve  Description: Ability to achieve and maintain a regular respiratory rate will improve  4/1/2020 0957 by Car Martinez RN  Outcome: Ongoing

## 2020-04-01 NOTE — PROGRESS NOTES
[facility] Progress [facility]    Admitting Date and Time: 3/19/2020  6:49 PM  Admit Dx: Pneumonia [J18.9]  Pneumonia [J18.9]    Subjective:  Patient is being followed for Pneumonia [J18.9]  Pneumonia [J18.9]     The patient feels that she is doing better. She states that she sleeps with her mouth open and has a sore throat in the morning. She would like a cough drop. She is tolerating a diet.      darunavir-cobicistat  1 tablet Oral Daily    ribavirin  1,200 mg Oral Q8H    levothyroxine  75 mcg Oral Daily    therapeutic multivitamin-minerals  1 tablet Oral Daily    vitamin D3  800 Units Oral Daily    sodium chloride flush  10 mL Intravenous 2 times per day    enoxaparin  40 mg Subcutaneous Daily     benzocaine-menthol, 1 lozenge, Q2H PRN  dextromethorphan-guaiFENesin, 1 tablet, Q4H PRN  artificial tears, , PRN  benzonatate, 100 mg, TID PRN  polyvinyl alcohol, 1 drop, PRN  sodium chloride flush, 10 mL, PRN  acetaminophen, 650 mg, Q6H PRN    Or  acetaminophen, 650 mg, Q6H PRN  polyethylene glycol, 17 g, Daily PRN  promethazine, 12.5 mg, Q6H PRN    Or  ondansetron, 4 mg, Q6H PRN         Objective:    BP (!) 122/58   Pulse 94   Temp 98.9 °F (37.2 °C)   Resp 26   Ht 5' 3\" (1.6 m)   Wt 135 lb (61.2 kg)   SpO2 92%   BMI 23.91 kg/m²     Gen: NAD, AAOx3, sitting in bed  HEENT: NCAT  CV: Tachycardia, HR 94  Resp: Equal chest rise b/l, non-labored breathing; saturating 93% on 5L N/C  Abd: Non-obese  Ext: Good ROM of b/l upper and lower extremities    Recent Labs     03/30/20  0600 03/31/20  1250 04/01/20  0330    134 137   K 3.5 3.5 3.6   CL 98 100 100   CO2 23 20* 22   BUN 10 12 10   CREATININE 0.7 0.7 0.7   GLUCOSE 91 100* 100*   CALCIUM 8.7 8.8 8.7       Recent Labs     03/30/20  0600 03/31/20  0220 04/01/20  0330   WBC 11.1 11.7* 11.1   RBC 4.00 3.84 3.69   HGB 11.6 11.2* 10.6*   HCT 35.0 34.0 32.7*   MCV 87.5 88.5 88.6   MCH 29.0 29.2 28.7   MCHC 33.1 32.9 32.4   RDW 13.2 13.3 13.3
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
## 2020-04-01 NOTE — CARE COORDINATION
Social Work:    Per morning rounds. Patient on 5 liters, c-diff negative. Goal is to transfer to her sister's home. Will need to ambulate closer to discharge. Social work following.     Electronically signed by DELILAH Santigao on 4/1/2020 at 1:22 PM
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
## 2020-04-02 LAB
ANION GAP SERPL CALCULATED.3IONS-SCNC: 14 MMOL/L (ref 7–16)
BUN BLDV-MCNC: 10 MG/DL (ref 8–23)
CALCIUM SERPL-MCNC: 8.9 MG/DL (ref 8.6–10.2)
CHLORIDE BLD-SCNC: 102 MMOL/L (ref 98–107)
CO2: 20 MMOL/L (ref 22–29)
CREAT SERPL-MCNC: 0.7 MG/DL (ref 0.5–1)
EKG ATRIAL RATE: 92 BPM
EKG P AXIS: 71 DEGREES
EKG P-R INTERVAL: 202 MS
EKG Q-T INTERVAL: 436 MS
EKG QRS DURATION: 148 MS
EKG QTC CALCULATION (BAZETT): 539 MS
EKG R AXIS: -59 DEGREES
EKG T AXIS: 77 DEGREES
EKG VENTRICULAR RATE: 92 BPM
GFR AFRICAN AMERICAN: >60
GFR NON-AFRICAN AMERICAN: >60 ML/MIN/1.73
GLUCOSE BLD-MCNC: 104 MG/DL (ref 74–99)
HCT VFR BLD CALC: 33.6 % (ref 34–48)
HEMOGLOBIN: 10.8 G/DL (ref 11.5–15.5)
MCH RBC QN AUTO: 28.6 PG (ref 26–35)
MCHC RBC AUTO-ENTMCNC: 32.1 % (ref 32–34.5)
MCV RBC AUTO: 88.9 FL (ref 80–99.9)
PDW BLD-RTO: 13.6 FL (ref 11.5–15)
PLATELET # BLD: 464 E9/L (ref 130–450)
PMV BLD AUTO: 9.7 FL (ref 7–12)
POTASSIUM REFLEX MAGNESIUM: 4.1 MMOL/L (ref 3.5–5)
RBC # BLD: 3.78 E12/L (ref 3.5–5.5)
REPORT: NORMAL
SARS-COV-2: NOT DETECTED
SODIUM BLD-SCNC: 136 MMOL/L (ref 132–146)
THIS TEST SENT TO: NORMAL
WBC # BLD: 11.3 E9/L (ref 4.5–11.5)

## 2020-04-02 PROCEDURE — 97535 SELF CARE MNGMENT TRAINING: CPT

## 2020-04-02 PROCEDURE — 93010 ELECTROCARDIOGRAM REPORT: CPT | Performed by: INTERNAL MEDICINE

## 2020-04-02 PROCEDURE — 6370000000 HC RX 637 (ALT 250 FOR IP): Performed by: SPECIALIST

## 2020-04-02 PROCEDURE — 97165 OT EVAL LOW COMPLEX 30 MIN: CPT

## 2020-04-02 PROCEDURE — 2060000000 HC ICU INTERMEDIATE R&B

## 2020-04-02 PROCEDURE — 85027 COMPLETE CBC AUTOMATED: CPT

## 2020-04-02 PROCEDURE — 97530 THERAPEUTIC ACTIVITIES: CPT

## 2020-04-02 PROCEDURE — 6360000002 HC RX W HCPCS: Performed by: SPECIALIST

## 2020-04-02 PROCEDURE — 6370000000 HC RX 637 (ALT 250 FOR IP): Performed by: INTERNAL MEDICINE

## 2020-04-02 PROCEDURE — 99233 SBSQ HOSP IP/OBS HIGH 50: CPT | Performed by: INTERNAL MEDICINE

## 2020-04-02 PROCEDURE — 80048 BASIC METABOLIC PNL TOTAL CA: CPT

## 2020-04-02 PROCEDURE — 6360000002 HC RX W HCPCS: Performed by: INTERNAL MEDICINE

## 2020-04-02 PROCEDURE — 2580000003 HC RX 258: Performed by: SPECIALIST

## 2020-04-02 PROCEDURE — 2700000000 HC OXYGEN THERAPY PER DAY

## 2020-04-02 PROCEDURE — 2580000003 HC RX 258: Performed by: INTERNAL MEDICINE

## 2020-04-02 PROCEDURE — 36415 COLL VENOUS BLD VENIPUNCTURE: CPT

## 2020-04-02 RX ORDER — SODIUM CHLORIDE 9 MG/ML
INJECTION, SOLUTION INTRAVENOUS CONTINUOUS
Status: DISCONTINUED | OUTPATIENT
Start: 2020-04-02 | End: 2020-04-06 | Stop reason: HOSPADM

## 2020-04-02 RX ORDER — DOXYCYCLINE HYCLATE 100 MG/1
100 CAPSULE ORAL EVERY 12 HOURS SCHEDULED
Status: DISCONTINUED | OUTPATIENT
Start: 2020-04-02 | End: 2020-04-06 | Stop reason: HOSPADM

## 2020-04-02 RX ADMIN — LEVOTHYROXINE SODIUM 75 MCG: 75 TABLET ORAL at 04:49

## 2020-04-02 RX ADMIN — ENOXAPARIN SODIUM 40 MG: 40 INJECTION SUBCUTANEOUS at 10:03

## 2020-04-02 RX ADMIN — Medication 1 TABLET: at 10:04

## 2020-04-02 RX ADMIN — DARUNAVIR ETHANOLATE AND COBICISTAT 1 EACH: 800; 150 TABLET, FILM COATED ORAL at 18:10

## 2020-04-02 RX ADMIN — CEFEPIME HYDROCHLORIDE 2 G: 2 INJECTION, POWDER, FOR SOLUTION INTRAVENOUS at 18:10

## 2020-04-02 RX ADMIN — DOXYCYCLINE HYCLATE 100 MG: 100 CAPSULE ORAL at 20:22

## 2020-04-02 RX ADMIN — SODIUM CHLORIDE: 9 INJECTION, SOLUTION INTRAVENOUS at 22:50

## 2020-04-02 RX ADMIN — RIBAVIRIN 1200 MG: 200 CAPSULE ORAL at 04:47

## 2020-04-02 RX ADMIN — SODIUM CHLORIDE, PRESERVATIVE FREE 10 ML: 5 INJECTION INTRAVENOUS at 18:10

## 2020-04-02 RX ADMIN — BENZOCAINE AND MENTHOL 1 LOZENGE: 15; 3.6 LOZENGE ORAL at 20:26

## 2020-04-02 RX ADMIN — CHOLECALCIFEROL TAB 10 MCG (400 UNIT) 800 UNITS: 10 TAB at 10:04

## 2020-04-02 RX ADMIN — Medication 10 ML: at 20:23

## 2020-04-02 RX ADMIN — RIBAVIRIN 1200 MG: 200 CAPSULE ORAL at 12:35

## 2020-04-02 RX ADMIN — Medication 10 ML: at 10:02

## 2020-04-02 RX ADMIN — RIBAVIRIN 1200 MG: 200 CAPSULE ORAL at 20:22

## 2020-04-02 ASSESSMENT — PAIN SCALES - GENERAL
PAINLEVEL_OUTOF10: 0
PAINLEVEL_OUTOF10: 0

## 2020-04-02 NOTE — PROGRESS NOTES
[address] [place] [address] [facility]  [facility]  Progress Note    SUBJECTIVE:  Chief Complaint   Patient presents with    Fever     highest of 101.0    Fatigue     fatigue/weakness x 1 week    Cough     occasional dry cough     The patient is feeling about the same today. No nausea or vomiting. She is still having some loose stools. Tolerating medications. Review of systems:  As stated above in the chief complaint, otherwise negative. Medications:  Scheduled Meds:   darunavir-cobicistat  1 tablet Oral Daily    ribavirin  1,200 mg Oral Q8H    levothyroxine  75 mcg Oral Daily    therapeutic multivitamin-minerals  1 tablet Oral Daily    vitamin D3  800 Units Oral Daily    sodium chloride flush  10 mL Intravenous 2 times per day    enoxaparin  40 mg Subcutaneous Daily     Continuous Infusions:  PRN Meds:benzocaine-menthol, dextromethorphan-guaiFENesin, artificial tears, benzonatate, polyvinyl alcohol, sodium chloride flush, acetaminophen **OR** acetaminophen, polyethylene glycol, promethazine **OR** ondansetron    OBJECTIVE:  BP (!) 136/59   Pulse 94   Temp 98.8 °F (37.1 °C) (Oral)   Resp 16   Ht 5' 3\" (1.6 m)   Wt 135 lb (61.2 kg)   SpO2 94%   BMI 23.91 kg/m²   Temp  Av.7 °F (37.1 °C)  Min: 98.5 °F (36.9 °C)  Max: 98.8 °F (37.1 °C)  Constitutional: The patient is sitting in bed. She is awake and alert. Oxygen by nasal cannula same at 5 L. Skin: Warm and dry. No rashes were noted. HEENT: Round and reactive pupils. Moist mucous membranes. No ulcerations or thrush. Neck: Supple to movements. Chest: Good breath sounds. No crackles. Cardiovascular: Heart sounds rhythmic and regular. Abdomen: Positive bowel sounds to auscultation. Benign to palpation. Extremities: No edema.   Lines: peripheral    Laboratory and Tests Review:  Lab Results   Component Value Date    WBC 11.3 2020    WBC 11.1 2020    WBC 11.7 (H) 2020    HGB 10.8 (L) 2020    HCT 33.6 (L) 04/02/2020    MCV 88.9 04/02/2020     (H) 04/02/2020     Lab Results   Component Value Date    NEUTROABS 8.44 (H) 03/27/2020    NEUTROABS 5.50 03/24/2020    NEUTROABS 7.45 (H) 03/20/2020     No results found for: [facility]  Lab Results   Component Value Date    ALT 28 03/30/2020    AST 27 03/30/2020    ALKPHOS 137 (H) 03/30/2020    BILITOT 0.8 03/30/2020     Lab Results   Component Value Date     04/02/2020    K 4.1 04/02/2020     04/02/2020    CO2 20 04/02/2020    BUN 10 04/02/2020    CREATININE 0.7 04/02/2020    CREATININE 0.7 04/01/2020    CREATININE 0.7 03/31/2020    GFRAA >60 04/02/2020    LABGLOM >60 04/02/2020    GLUCOSE 104 04/02/2020    PROT 7.4 03/30/2020    LABALBU 2.8 03/30/2020    CALCIUM 8.9 04/02/2020    BILITOT 0.8 03/30/2020    ALKPHOS 137 03/30/2020    AST 27 03/30/2020    ALT 28 03/30/2020     Lab Results   Component Value Date    CRP 27.6 (H) 03/30/2020    CRP 21.2 (H) 03/24/2020    CRP 27.2 (H) 03/21/2020     Lab Results   Component Value Date    SEDRATE 110 (H) 03/30/2020    SEDRATE 90 (H) 03/24/2020    SEDRATE 65 (H) 03/21/2020     Radiology:  Bilateral patchy infiltrates. They seem to be consolidated more so than before    Microbiology:   COVID19 NP swab by PCR (Sent to [facility]) 3/19/2020: Not detected  Blood cultures 3/19/2020 CoNS in 1 of 4 bottles  Respiratory culture: Ordered but not sent  Urine culture 3/20/2020 <10,000 GPO  Insert urine antigens  Respiratory panel: Negative  Rapid influenza: Negative  Procalcitonin: 0.13    ASSESSMENT:  · Probable SARS-COV-2/COVID19 infection. Result came back as not detected  · Probable viral pneumonia. Stable O2 requirements have not increased  · Fever associated to the above, improving  · CoNS bacteremia. Contaminant  · Diarrhea secondary to COVID19 versus antibiotic associated diarrhe C. difficile infection ruled out a. Possible C.  Difficile  · Possible superimposed bacterial pneumonia    PLAN:  · Completed a minimum of 5 days of Azithromycin  · Completed a minimum of 10 days of Hydroxychloroquine on 4/1/2020  · Continue protease inhibitor Prezcobix (Darunavir plus cobicistat) and Ribavirin    · Start Levofloxacin and Doxycycline  · Continue droplet/contact isolation.   Patient is not in a negative pressure room    Lindsey Hartman  3:20 PM  4/2/2020

## 2020-04-02 NOTE — CARE COORDINATION
PENDING COVID-19, new test sent 4/1-PLAN IS TO GO TO SISTER TERRIE'S HOME [address]-WILL NEED [facility] FOR NURsING AND PT ONLY-POSS O2 TO [facility]-DME HAS [facility]-     PT [facility] 15,  [facility]  Ginger, MSN, RN  [facility] Case Management  [phone]

## 2020-04-02 NOTE — PROGRESS NOTES
4/2/2020 1409  Last data filed at 4/2/2020 1350  Gross per 24 hour   Intake --   Output 850 ml   Net -850 ml       Vent Information  FiO2 : 21 %         CURRENT MEDS :  Scheduled Meds:   darunavir-cobicistat  1 tablet Oral Daily    ribavirin  1,200 mg Oral Q8H    levothyroxine  75 mcg Oral Daily    therapeutic multivitamin-minerals  1 tablet Oral Daily    vitamin D3  800 Units Oral Daily    sodium chloride flush  10 mL Intravenous 2 times per day    enoxaparin  40 mg Subcutaneous Daily       Physical Exam:  General Appearance: appears comfortable in no acute distress. HEENT: Normocephalic atraumatic without obvious abnormality   Neck: Lips, mucosa, and tongue normal.  Supple, symmetrical, trachea midline, no adenopathy;thyroid:  no enlargement/tenderness/nodules or JVD. Lung: Breath sounds few basilar crackles. Respirations   unlabored. Symmetrical expansion. Heart: RRR, normal S1, S2. No MRG  Abdomen: Soft, NT, ND. BS present x 4 quadrants. No bruit or organomegaly. Extremities: Pedal pulses 2+ symmetric b/l. Extremities normal, no cyanosis, clubbing, or edema. Musculokeletal: No joint swelling, no muscle tenderness. ROM normal in all joints of extremities. Neurologic: Mental status: Alert and Oriented X3 . Pertinent/ New Labs and Imaging Studies     Imaging Personally Reviewed:  4/1/2020     The heart is unremarkable. The lung fields demonstrate progression of patchy consolidative   opacities in the mid and lower lung fields bilaterally. The aorta is unremarkable.           Impression   Bilateral patchy consolidative opacities compatible with   multifocal or viral pneumonia, with interval worsening.       The study was dictated by Rema Mcgill PA-C and Jr Wilkinson MD   reviewed and concurred with the findings.               3/19/2020 CT chest without contrast  Impression       1.  Patchy areas of groundglass opacity with interlobular septal   thickening seen within the bilateral upper and to a lesser extent   lower lobes, consistent with multilobar pneumonia.                         Labs:  Lab Results   Component Value Date    WBC 11.3 04/02/2020    HGB 10.8 04/02/2020    HCT 33.6 04/02/2020    MCV 88.9 04/02/2020    MCH 28.6 04/02/2020    MCHC 32.1 04/02/2020    RDW 13.6 04/02/2020     04/02/2020    MPV 9.7 04/02/2020     Lab Results   Component Value Date     04/02/2020    K 4.1 04/02/2020     04/02/2020    CO2 20 04/02/2020    BUN 10 04/02/2020    CREATININE 0.7 04/02/2020    LABALBU 2.8 03/30/2020    CALCIUM 8.9 04/02/2020    GFRAA >60 04/02/2020    LABGLOM >60 04/02/2020     No results found for: PROTIME, INR  No results for input(s): PROBNP in the last 72 hours. No results for input(s): PROCAL in the last 72 hours. This SmartLink has not been configured with any valid records. Micro:  No results for input(s): CULTRESP in the last 72 hours. No results for input(s): LABGRAM in the last 72 hours. No results for input(s): LEGUR in the last 72 hours. No results for input(s): STREPNEUMAGU in the last 72 hours. No results for input(s): LP1UAG in the last 72 hours. Assessment:    1. Acute respiratory failure with hypoxia  2. Viral Pneumonia  3. Possible COVID 19 infection  4. Diarrhea, c diff negative  5. CONS bacteremia-contaminant    Plan:   1. Oxygen therapy  5 L high flow nasal cannula  2. Continue Plaquenil per ID service, off Azithro. Also on protease inhibitor Prezcobix (Darunavir plus cobicistat) and Ribavirin   3. COVID 19 testing resent on 4/1/2020  4. No nebulizers or NSAIDs  5. Stool for c diff negative  6. Chest x-ray 4/1/2020  7. Dvt, GI prophylaxis   This plan of care was reviewed in collaboration with Dr. Dinesh Gonzalez  Electronically signed by JED Dill CNP on 4/2/2020 at 2:09 PM     I personally saw, examined, and cared for the patient. Labs, medications, radiographs reviewed.  I agree with history exam and plans detailed in NP note.    Oxygenation remains 5 lpm   Doing well   Follow up viral testing   Pt/ot when able     Deja Robbins M.D.    Pulmonary/Critical Care Medicine

## 2020-04-02 NOTE — PROGRESS NOTES
Limited  Patient will demonstrate Good understanding and consistent implementation of energy conservation techniques and work simplification techniques into ADL/IADL routines. Visual/  Perceptual WFL     N/A     Long-Term Goal: Patient will increase functional independence to PLOF in order to allow patient to live in least restrictive environment. Strength: ROM: Additional Information:    R UE  3+/5  AAROM WFL    L UE 3+/5  AAROM WFL      Hearing: [facility]  Sensation: No complaints of numbness/tingling in B UEs. Tone: WFL  Edema: No    Comments: RN approved patient's participation in [address] [place] [address] activities. Upon arrival, patient supine in bed. At end of session, patient seated in bedside chair with call light and phone within reach, waffle cushion in place, and all lines and tubes intact. Patient would benefit from continued skilled OT to increase safety and independence with completion of ADL/IADL tasks for functional independence and quality of life. Treatment: Patient education provided regardin) safe transfer techniques, 2) importance of OOB activities, 3) OT plan of care, 4) importance of having assistance with ADLs and functional transfers/mobility to prevent falls. Patient verbalized understanding.     Eval Complexity: Low    Assessment of Current Deficits:   Functional Mobility [x]  ADLs [x] Strength [x]  Cognition []  Functional Transfers  [x] IADLs [x] Safety Awareness [x]  Endurance [x]  Fine Motor Coordination [] Balance [x] Vision/Perception [] Sensation []   Gross Motor Coordination [] ROM [] Delirium []                  Motor Control []    Plan of Care:   OT treatment to be provided 2-5x/week for 3-7 days to address the following, as needed, during hospitalization:  ADL Retraining [x]   Equipment Needs [x]   Neuromuscular Re-Education [x] Energy Conservation Techniques [x]  Functional Transfer Training [x] Patient and/or Family Education [x]  Functional Mobility Training [x] Environmental Modifications [x]  Cognitive Re-Training []   Compensatory Techniques for ADLs [x]  Splinting Needs []   Positioning to Improve Overall Function [x]   Therapeutic Activity [x]  Therapeutic Exercise  [x]  Visual/Perceptual: []    Delirium Prevention/Treatment  []  Other:  []    Rehab Potential: Good for established goals. Patient / Family Goal: Patient indicated that she wants to return to her PLOF. Patient and/or family were instructed on functional diagnosis, prognosis/goals, and OT plan of care. Demonstrated Good understanding. Low complexity evaluation + 15 timed treatment minutes  Treatment Time In: 1155  Treatment Time Out: 1210    Treatment Charges: Minutes: Units: Ther Ex  [id number]     Manual Therapy Parva Domus [phone]     ADL/Home Mgt [id number] 15 1   Neuro Re-ed [id number]     Group Therapy      Orthotic manage/training  92104     Total Timed Treatment 15 1     Evaluation time includes thorough review of current medical information, gathering information on past medical history/social history and prior level of function, completion of standardized testing/informal observation of tasks, assessment of data, and development of POC/Goals. Ирина Ibarra OTR/L  License Number: [id number]

## 2020-04-02 NOTE — PROGRESS NOTES
Subjective  Chief Complaint - fever, fatigue, cough    History of Present Illness  4/2  This is Dr. Param Stephen assuming care of this patient. This patient is a previously healthy 76-year-old female who was admitted to this facility on 3/19 with complaints of cough, fevers, and fatigue. She had been swabbed for COVID-19 infection on presentation, though with results still not back, she was re-swabbed yesterday 4/1, though clinical picture is consistent with this infection. Patient was seen this afternoon at bedside. Shortness of breath remains, she is dyspneic even with conversation though is in no acute distress. Her bigger concern, however, is her continued diarrhea, which she says is not really any better than yesterday. No abdominal pain, nausea, emesis, but she says she just cannot control when she goes and she is embarrassed that staff asked to come clean her up. No fevers or chills or other new issues today.     Review of Systems - 12-point review of systems has been reviewed and is otherwise negative except as listed in the HPI    Objective  Physical Exam - physical exam normal as below EXCEPT FOR ABNORMAL FINDINGS as listed immediately below line, which supersede the normal findings listed  Vitals: BP (!) 136/59   Pulse 94   Temp 98.8 °F (37.1 °C) (Oral)   Resp 16   Ht 5' 3\" (1.6 m)   Wt 135 lb (61.2 kg)   SpO2 94%   BMI 23.91 kg/m²   General: well-developed, well-nourished, no acute distress, cooperative  Skin: warm, dry, intact, normal color without cyanosis  HEENT: normocephalic, atraumatic, mucous membranes normal  Respiratory: clear to auscultation bilaterally without respiratory distress  Cardiovascular: regular rate and rhythm without murmur / rub / gallop  Abdominal: soft, nontender, nondistended, normoactive bowel sounds  Extremities: no mottling, pulses intact, no edema  Neurologic: awake, alert, no focal deficits  Psychiatric: normal affect, cooperative  ----------------------------------------------------------------------   Nasal cannula oxygen in place   Breath sounds globally diminished   No abdominal TTP, hyperactive bowel sounds essentially in all 4 quadrants    Diagnostic Impressions / Differential Diagnosis / Discussion  Note: no information from this section of the note (from this sentence up to the Assessment and Plan) is to be used for any coding or billing and is purely a way of documenting my thought process regarding this patient's most significant medical issues. *4/2  This morning, patient is borderline tachycardic on 5 L nasal cannula. CBC does show stable anemia with hemoglobin 10.8, thrombocytosis with platelet count ranging between 450 and 500. Metabolic profile unremarkable including renal function and LFTs. Inflammatory markers elevated. Chest x-ray showing worsening multilobar pneumonia with CT showing bilateral groundglass opacities. Regarding blood cultures, 1 of 2 of the original set had returned positive for coagulase-negative staph, likely contaminant. Influenza and respiratory viral panels negative. Infectious diseases following, with antibiotics at their discretion, as is pulmonology. Notes have been reviewed. Retesting for COVID sent yesterday, await results. C. difficile negative.     Assessment and Plan  #1 COVID-19 infection ruled out   Diagnostics: Patient currently afebrile     Testing sent out 3/19, re-sent 4/1 - returned 4/2 @ 1229 - NEGATIVE     Influenza panel NEG     Respiratory viral panel NEG     CXR showing multilobar pneumonia, worsening     CT chest showing bilateral ground glass opacities     CBC shows WBC normal     CMP shows no transaminitis     overall HIGH SUSPICION FOR COVID-19 infection     -------------------------------------------------------------------   Treatment: ID following     Azithromycin completed     Hydroxychloroquine completed     Darunavir-cobicistat     Ribavirin     Tylenol / ice packs for fever     Demerol if needed for shaking chills refractory to above measures     -------------------------------------------------------------------   Supportive tx: Droplet Plus isolation can be dc'd from my standpoint  Supplemental O2 prn via NC / HFNC / NRB up to 8-10 L on floor     Transfer to ICU if requiring >10 L     Avoid NIPPV / CPAP / BIPAP     Incentive spirometer     Low threshold for endotracheal intubation     Avoid nebulized breathing tx, sub with MDI medications     Steroids not shown to be beneficial with COVID-19     Avoid IVF if possible; if necessary boluses > infusions     Avoid NSAIDs    #2 anemia   Hgb 10.8 g/dL   Baseline appears to be ~12   Monitor, transfuse as needed    Code status            - full  DVT prophylaxis       - Lovenox  Expected disposition - TBD

## 2020-04-03 LAB
ANION GAP SERPL CALCULATED.3IONS-SCNC: 12 MMOL/L (ref 7–16)
BUN BLDV-MCNC: 11 MG/DL (ref 8–23)
CALCIUM SERPL-MCNC: 8.6 MG/DL (ref 8.6–10.2)
CHLORIDE BLD-SCNC: 95 MMOL/L (ref 98–107)
CO2: 21 MMOL/L (ref 22–29)
CREAT SERPL-MCNC: 0.6 MG/DL (ref 0.5–1)
GFR AFRICAN AMERICAN: >60
GFR NON-AFRICAN AMERICAN: >60 ML/MIN/1.73
GLUCOSE BLD-MCNC: 129 MG/DL (ref 74–99)
HCT VFR BLD CALC: 29.8 % (ref 34–48)
HEMOGLOBIN: 10.2 G/DL (ref 11.5–15.5)
HEMOGLOBIN: 9.9 G/DL (ref 11.5–15.5)
MCH RBC QN AUTO: 28.9 PG (ref 26–35)
MCHC RBC AUTO-ENTMCNC: 33.2 % (ref 32–34.5)
MCV RBC AUTO: 87.1 FL (ref 80–99.9)
PDW BLD-RTO: 13.3 FL (ref 11.5–15)
PLATELET # BLD: 357 E9/L (ref 130–450)
PMV BLD AUTO: 10.3 FL (ref 7–12)
POTASSIUM REFLEX MAGNESIUM: 4.7 MMOL/L (ref 3.5–5)
RBC # BLD: 3.42 E12/L (ref 3.5–5.5)
REPORT: NORMAL
SARS-COV-2: NOT DETECTED
SODIUM BLD-SCNC: 128 MMOL/L (ref 132–146)
SODIUM BLD-SCNC: 130 MMOL/L (ref 132–146)
THIS TEST SENT TO: NORMAL
WBC # BLD: 10.8 E9/L (ref 4.5–11.5)

## 2020-04-03 PROCEDURE — 84295 ASSAY OF SERUM SODIUM: CPT

## 2020-04-03 PROCEDURE — 80048 BASIC METABOLIC PNL TOTAL CA: CPT

## 2020-04-03 PROCEDURE — 6370000000 HC RX 637 (ALT 250 FOR IP): Performed by: INTERNAL MEDICINE

## 2020-04-03 PROCEDURE — 99233 SBSQ HOSP IP/OBS HIGH 50: CPT | Performed by: INTERNAL MEDICINE

## 2020-04-03 PROCEDURE — 2580000003 HC RX 258: Performed by: INTERNAL MEDICINE

## 2020-04-03 PROCEDURE — 2580000003 HC RX 258: Performed by: SPECIALIST

## 2020-04-03 PROCEDURE — 6360000002 HC RX W HCPCS: Performed by: SPECIALIST

## 2020-04-03 PROCEDURE — 94761 N-INVAS EAR/PLS OXIMETRY MLT: CPT

## 2020-04-03 PROCEDURE — 85018 HEMOGLOBIN: CPT

## 2020-04-03 PROCEDURE — 6370000000 HC RX 637 (ALT 250 FOR IP): Performed by: SPECIALIST

## 2020-04-03 PROCEDURE — 97530 THERAPEUTIC ACTIVITIES: CPT

## 2020-04-03 PROCEDURE — 36415 COLL VENOUS BLD VENIPUNCTURE: CPT

## 2020-04-03 PROCEDURE — 6360000002 HC RX W HCPCS: Performed by: INTERNAL MEDICINE

## 2020-04-03 PROCEDURE — 97535 SELF CARE MNGMENT TRAINING: CPT

## 2020-04-03 PROCEDURE — 2060000000 HC ICU INTERMEDIATE R&B

## 2020-04-03 PROCEDURE — 2700000000 HC OXYGEN THERAPY PER DAY

## 2020-04-03 PROCEDURE — 85027 COMPLETE CBC AUTOMATED: CPT

## 2020-04-03 PROCEDURE — 97110 THERAPEUTIC EXERCISES: CPT

## 2020-04-03 RX ADMIN — ENOXAPARIN SODIUM 40 MG: 40 INJECTION SUBCUTANEOUS at 10:01

## 2020-04-03 RX ADMIN — Medication 1 TABLET: at 10:00

## 2020-04-03 RX ADMIN — Medication 10 ML: at 20:16

## 2020-04-03 RX ADMIN — RIBAVIRIN 1200 MG: 200 CAPSULE ORAL at 05:28

## 2020-04-03 RX ADMIN — CHOLECALCIFEROL TAB 10 MCG (400 UNIT) 800 UNITS: 10 TAB at 10:00

## 2020-04-03 RX ADMIN — CEFEPIME HYDROCHLORIDE 2 G: 2 INJECTION, POWDER, FOR SOLUTION INTRAVENOUS at 17:35

## 2020-04-03 RX ADMIN — RIBAVIRIN 1200 MG: 200 CAPSULE ORAL at 12:42

## 2020-04-03 RX ADMIN — DOXYCYCLINE HYCLATE 100 MG: 100 CAPSULE ORAL at 10:00

## 2020-04-03 RX ADMIN — BENZOCAINE AND MENTHOL 1 LOZENGE: 15; 3.6 LOZENGE ORAL at 10:00

## 2020-04-03 RX ADMIN — DOXYCYCLINE HYCLATE 100 MG: 100 CAPSULE ORAL at 20:16

## 2020-04-03 RX ADMIN — BENZONATATE 100 MG: 100 CAPSULE ORAL at 10:40

## 2020-04-03 RX ADMIN — LEVOTHYROXINE SODIUM 75 MCG: 75 TABLET ORAL at 05:29

## 2020-04-03 RX ADMIN — BENZONATATE 100 MG: 100 CAPSULE ORAL at 17:35

## 2020-04-03 RX ADMIN — CEFEPIME HYDROCHLORIDE 2 G: 2 INJECTION, POWDER, FOR SOLUTION INTRAVENOUS at 05:28

## 2020-04-03 NOTE — CARE COORDINATION
PENDING COVID-19, new test sent 4/1-PLAN IS TO GO TO [facility] [address]-WILL NEED [facility] FOR NURsING AND PT ONLY-POSS O2 TO [facility]-      [facility]    Of note, patient with continued diarrhea and worsening imaging on cxr; ID and pulm stll following. Will follow along with  and assist with discharge planning as necessary. Nonda Night.  Ginger, MSN, RN  [facility] Case Management  [phone]

## 2020-04-03 NOTE — PROGRESS NOTES
Negar Moreno M.D.,FCCP  Jessica Shelter D.O., F.A.C.O.I., Elan Collado M.D. Deja Robbins M.D., Vani Enrique M.D. Mason Bell D.O. Daily Pulmonary Progress Note    Patient:  Gina Calixto 75 y.o. female MRN: [id number]     Date of Service: 4/3/2020      Synopsis     We are following patient for respiratory distress with hypoxia viral pneumonitis, probable COVID 19 pneumonitis, results came back not detected    \"CC\" shortness of breath, cough    Code status: Full      Subjective      Patient was seen and examined. Lying in bed in no acute distress. Oxygen down to 3 L. Saturation at rest 93%. She still complains of overall weakness and fatigue. Cough present less dyspnea. She has no mucus production. Review of Systems:  Constitutional: Denies fever, weight loss, night sweats, and fatigue  Skin: Denies pigmentation, dark lesions, and rashes   HEENT: Denies hearing loss, tinnitus, ear drainage, epistaxis, sore throat, and hoarseness. Cardiovascular: Denies palpitations, chest pain, and chest pressure.   Respiratory: As above  Gastrointestinal: Denies nausea, vomiting, poor appetite, diarrhea, heartburn or reflux  Genitourinary: Denies dysuria, frequency, urgency or hematuria  Musculoskeletal: Denies myalgias, muscle weakness, and bone pain  Neurological: Denies dizziness, vertigo, headache, and focal weakness  Psychological: Denies anxiety and depression  Endocrine: Denies heat intolerance and cold intolerance  Hematopoietic/Lymphatic: Denies bleeding problems and blood transfusions    24-hour events:  None    Objective   Vitals: /64   Pulse 92   Temp 98.6 °F (37 °C) (Oral)   Resp 22   Ht 5' 3\" (1.6 m)   Wt 135 lb (61.2 kg)   SpO2 93%   BMI 23.91 kg/m²     I/O:    Intake/Output Summary (Last 24 hours) at 4/3/2020 9984  Last data filed at 4/2/2020 2244  Gross per 24 hour   Intake --   Output 350 ml   Net -350 ml       Vent Information  FiO2 : 21 % CURRENT MEDS :  Scheduled Meds:   doxycycline hyclate  100 mg Oral 2 times per day    cefepime  2 g Intravenous Q12H    levothyroxine  75 mcg Oral Daily    therapeutic multivitamin-minerals  1 tablet Oral Daily    vitamin D3  800 Units Oral Daily    sodium chloride flush  10 mL Intravenous 2 times per day    enoxaparin  40 mg Subcutaneous Daily       Physical Exam:  General Appearance: appears comfortable in no acute distress. HEENT: Normocephalic atraumatic without obvious abnormality   Neck: Lips, mucosa, and tongue normal.  Supple, symmetrical, trachea midline, no adenopathy;thyroid:  no enlargement/tenderness/nodules or JVD. Lung: Breath sounds few basilar crackles. Respirations   unlabored. Symmetrical expansion. Heart: RRR, normal S1, S2. No MRG  Abdomen: Soft, NT, ND. BS present x 4 quadrants. No bruit or organomegaly. Extremities: Pedal pulses 2+ symmetric b/l. Extremities normal, no cyanosis, clubbing, or edema. Musculokeletal: No joint swelling, no muscle tenderness. ROM normal in all joints of extremities. Neurologic: Mental status: Alert and Oriented X3 . Pertinent/ New Labs and Imaging Studies     Imaging Personally Reviewed:  4/1/2020     The heart is unremarkable. The lung fields demonstrate progression of patchy consolidative   opacities in the mid and lower lung fields bilaterally. The aorta is unremarkable.           Impression   Bilateral patchy consolidative opacities compatible with   multifocal or viral pneumonia, with interval worsening.       The study was dictated by Jerry Harvey PA-C and Maegan Juarez MD   reviewed and concurred with the findings.               3/19/2020 CT chest without contrast  Impression       1.  Patchy areas of groundglass opacity with interlobular septal   thickening seen within the bilateral upper and to a lesser extent   lower lobes, consistent with multilobar pneumonia.                          Labs:  Lab Results   Component Value Date    WBC 10.8 04/03/2020    HGB 10.2 04/03/2020    HCT 29.8 04/03/2020    MCV 87.1 04/03/2020    MCH 28.9 04/03/2020    MCHC 33.2 04/03/2020    RDW 13.3 04/03/2020     04/03/2020    MPV 10.3 04/03/2020     Lab Results   Component Value Date     04/03/2020    K 4.7 04/03/2020    CL 95 04/03/2020    CO2 21 04/03/2020    BUN 11 04/03/2020    CREATININE 0.6 04/03/2020    LABALBU 2.8 03/30/2020    CALCIUM 8.6 04/03/2020    GFRAA >60 04/03/2020    LABGLOM >60 04/03/2020     No results found for: PROTIME, INR  No results for input(s): PROBNP in the last 72 hours. No results for input(s): PROCAL in the last 72 hours. This SmartLink has not been configured with any valid records. Micro:  No results for input(s): CULTRESP in the last 72 hours. No results for input(s): LABGRAM in the last 72 hours. No results for input(s): LEGUR in the last 72 hours. No results for input(s): STREPNEUMAGU in the last 72 hours. No results for input(s): LP1UAG in the last 72 hours. Repeat COVID 19 testing 4/1/2020 not detected   Assessment:    1. Acute respiratory failure with hypoxia  2. Viral Pneumonia-repeat COVID testing not detected on 4/1/2020  3. Diarrhea, c diff negative  4. CONS bacteremia-contaminant    Plan:   1. Oxygen therapy  3 L high flow nasal cannula  2. Completed a minimum of 10 days of Hydroxychloroquine on 4/1/2020  3. Stopped  protease inhibitor Prezcobix (Darunavir plus cobicistat) and Ribavirin,    Continue oral Doxycycline and Cefepime for now per ID service  4. COVID 19 testing resent on 4/1/2020-not detected  5. No nebulizers or NSAIDs  6. Stool for c diff negative  7. Chest x-ray 4/1/2020  8. Dvt, GI prophylaxis   This plan of care was reviewed in collaboration with Dr. Iglesias Friday  Electronically signed by JED Buckley - CNP on 4/3/2020 at 3:34 PM         I personally saw, examined, and cared for the patient. Labs, medications, radiographs reviewed.  I agree with history exam and plans detailed in NP note. Jj Bailey M.D.    Pulmonary/Critical Care Medicine

## 2020-04-03 NOTE — PROGRESS NOTES
Physical Therapy    Facility/Department: [address] INTERMEDIATE  Treatment note    NAME: Gina Calixto  : 1945  MRN: [id number]    Date of Service: 4/3/2020               Patient Diagnosis(es): The encounter diagnosis was Pneumonia due to infectious organism, unspecified laterality, unspecified part of lung.     has no past medical history on file. has no past surgical history on file. Evaluating Therapist: Roc Gauthier PT     Referring Provider:  Manpreet Haynes MD    Room #: 652   DIAGNOSIS:  PNA   PRECAUTIONS: falls, droplet plus, ) 2 @ 4 LNC , continuous pulse ox monitoring     Social:  Pt lives alone  in a  1  floor plan  Prior to admission pt walked with  No AD. Pt reports she needs wedged shoed to walk due to previous L ankle injury      Initial Evaluation  Date: 2020 Treatment  4/3/2020    Short Term/ Long Term   Goals   Was pt agreeable to Eval/treatment? yes  yes    Does pt have pain?  no, reports weakness  And discomfort from cough  No complaints, states very weak    Bed Mobility  Rolling:  Min assist   Supine to sit: min assist   Sit to supine: Mod assist   Scooting:  Min assist Rolling: NT  Supine to sit: NT  Sit to supine: Min A LE support  Scooting: SBA side to side in bed  SBA   Transfers Sit to stand:  Min assist   Stand to sit:  Min assist   Stand pivot:  NT  Sit to stand: Min A  Stand to sit: Min A  Stand Pivot;  Mod A without A/D  SBA    Ambulation     4 side steps  with no AD  with min/mod assist  6 small steps bed to chair without A/D Mod A for balance  50 feet with  AAD  with  SBA        Stair negotiation: ascended and descended NT  NT  4  steps with  1 rail with  SBA  ( ad able    LE ROM  Grossly WFL      LE strength  3+ to 4-/ 5      AM- PAC RAW score   15/ 24  15/24            Balance: poor during transfer without A/D    Pt performed therapeutic exercise of the following: seated B ankle pumps AROM; B heel slides motions, hip ABd/ADd and SAQ's A/AAROM x 20    Patient education  Pt was educated on exercise and transfers    Patient response to education:   Pt verbalized understanding Pt demonstrated skill Pt requires further education in this area   yes With instruction yes     ASSESSMENT:   Comments: Nurse ok with Rx. Pt found sitting EOB with JOSE ANTONIO, assistance given for transfer bed to bedside chair. While there, LE exercise performed. Pt then states too fatigued to remain in the chair, requested to go back to bed, encouraged to sit up without success. Pt then transferred back to bed. Pt able to WB and advance LEs during pivot transfers but remains with poor balance. Pt with B shoes donned for Rx today. Treatment: Pt practiced and was instructed in the following treatment: transfer participation, exercise promoting circulation and strengthening     Pt was left in bed per request with call light in reach    Time in 1053   Time out 1125  Total Treatment Time 32 minutes   CPT codes:     Therapeutic activities 64088 10 minutes   Therapeutic exercises 94861 22 minutes       Pt is making fair progress toward established Physical Therapy goals as per transfer participation and exercise performed. Continue with physical therapy current plan of care promoting increased time OOB as able.     Stephanie Guerrero PTA   License Number: [id number]

## 2020-04-03 NOTE — PROGRESS NOTES
[facility] Quality Flow/Interdisciplinary Rounds Progress Note        Quality Flow Rounds held on April 3, 2020    Disciplines Attending:  Bedside Nurse, ,  and Nursing Unit Leadership    David Man was admitted on 3/19/2020  6:49 PM    Anticipated Discharge Date:  Expected Discharge Date: 04/06/20    Disposition:    Alek Score:  Alek Scale Score: 19    Readmission Risk              Risk of Unplanned Readmission:        10           Discussed patient goal for the day, patient clinical progression, and barriers to discharge. The following Goal(s) of the Day/Commitment(s) have been identified:  await repeat COVID testing, wean oxygen.       Denise Au  April 3, 2020

## 2020-04-03 NOTE — PROGRESS NOTES
[address] [place] [address] [facility]  ANGEL  Progress Note    SUBJECTIVE:  Chief Complaint   Patient presents with    Fever     highest of 101.0    Fatigue     fatigue/weakness x 1 week    Cough     occasional dry cough     The patient continues to improve slowly. She had one small loose bowel movement today. No nausea or vomiting. Minimal dyspnea. Working with physical therapy. Review of systems:  As stated above in the chief complaint, otherwise negative. Medications:  Scheduled Meds:   doxycycline hyclate  100 mg Oral 2 times per day    cefepime  2 g Intravenous Q12H    darunavir-cobicistat  1 tablet Oral Daily    ribavirin  1,200 mg Oral Q8H    levothyroxine  75 mcg Oral Daily    therapeutic multivitamin-minerals  1 tablet Oral Daily    vitamin D3  800 Units Oral Daily    sodium chloride flush  10 mL Intravenous 2 times per day    enoxaparin  40 mg Subcutaneous Daily     Continuous Infusions:   sodium chloride 12.5 mL/hr at 20 2250     PRN Meds:benzocaine-menthol, dextromethorphan-guaiFENesin, artificial tears, benzonatate, polyvinyl alcohol, sodium chloride flush, acetaminophen **OR** acetaminophen, polyethylene glycol, promethazine **OR** ondansetron    OBJECTIVE:  /64   Pulse 92   Temp 98.6 °F (37 °C) (Oral)   Resp 22   Ht 5' 3\" (1.6 m)   Wt 135 lb (61.2 kg)   SpO2 93%   BMI 23.91 kg/m²   Temp  Av.8 °F (37.1 °C)  Min: 98.5 °F (36.9 °C)  Max: 99.2 °F (37.3 °C)  Constitutional: The patient is sitting in bed. She is awake and alert. Oxygen by nasal cannula down to 3 L. Skin: Warm and dry. No rashes were noted. HEENT: Round and reactive pupils. Moist mucous membranes. No ulcerations or thrush. Neck: Supple to movements. Chest: Good breath sounds. Some crackles right base posteriorly. Cardiovascular: Heart sounds rhythmic and regular. Abdomen: Positive bowel sounds to auscultation. Benign to palpation. Extremities: No edema.   Lines: peripheral    Laboratory and Tests Review:  Lab Results   Component Value Date    WBC 10.8 04/03/2020    WBC 11.3 04/02/2020    WBC 11.1 04/01/2020    HGB 10.2 (L) 04/03/2020    HCT 29.8 (L) 04/03/2020    MCV 87.1 04/03/2020     04/03/2020     Lab Results   Component Value Date    NEUTROABS 8.44 (H) 03/27/2020    NEUTROABS 5.50 03/24/2020    NEUTROABS 7.45 (H) 03/20/2020     No results found for: [facility]  Lab Results   Component Value Date    ALT 28 03/30/2020    AST 27 03/30/2020    ALKPHOS 137 (H) 03/30/2020    BILITOT 0.8 03/30/2020     Lab Results   Component Value Date     04/03/2020    K 4.7 04/03/2020    CL 95 04/03/2020    CO2 21 04/03/2020    BUN 11 04/03/2020    CREATININE 0.6 04/03/2020    CREATININE 0.7 04/02/2020    CREATININE 0.7 04/01/2020    GFRAA >60 04/03/2020    LABGLOM >60 04/03/2020    GLUCOSE 129 04/03/2020    PROT 7.4 03/30/2020    LABALBU 2.8 03/30/2020    CALCIUM 8.6 04/03/2020    BILITOT 0.8 03/30/2020    ALKPHOS 137 03/30/2020    AST 27 03/30/2020    ALT 28 03/30/2020     Lab Results   Component Value Date    CRP 27.6 (H) 03/30/2020    CRP 21.2 (H) 03/24/2020    CRP 27.2 (H) 03/21/2020     Lab Results   Component Value Date    SEDRATE 110 (H) 03/30/2020    SEDRATE 90 (H) 03/24/2020    SEDRATE 65 (H) 03/21/2020     Radiology:  Bilateral patchy infiltrates. They seem to be consolidated more so than before    Microbiology:   COVID19 NP swab by PCR (Sent to [facility]) 3/19/2020: Not detected  Blood cultures 3/19/2020 CoNS in 1 of 4 bottles  Respiratory culture: Ordered but not sent  Urine culture 3/20/2020 <10,000 GPO  Insert urine antigens  Respiratory panel: Negative  Rapid influenza: Negative  Procalcitonin: 0.13    ASSESSMENT:  · Probable SARS-COV-2/COVID19 infection. Result came back as not detected  · Probable viral pneumonia. Stable O2 requirements have not increased  · Fever associated to the above, improving  · CoNS bacteremia.   Contaminant  · Diarrhea secondary to COVID19 versus antibiotic associated diarrhe C. difficile infection ruled out a. Possible C. Difficile  · Possible superimposed bacterial pneumonia    PLAN:  · Completed a minimum of 5 days of Azithromycin  · Completed a minimum of 10 days of Hydroxychloroquine on 4/1/2020  · Continue oral Doxycycline and Cefepime for now  · We will go ahead and stop protease inhibitor Prezcobix (Darunavir plus cobicistat) and Ribavirin    · Start Levofloxacin and Doxycycline  · Continue droplet/contact isolation.   Patient is not in a negative pressure room    Addy Masonar  2:39 PM  4/3/2020

## 2020-04-03 NOTE — PROGRESS NOTES
Occupational Therapy  OT BEDSIDE TREATMENT NOTE      Date:4/3/2020  Patient Name: Ananth Brush  MRN: [id number]  : 1945  Room: [address] [place] [address]     Per OT Eval:      Referring Provider: Gilford Rodney, MD  Evaluating OT: Sanna Sutton. Michelle OTR/L - [id number]     AM-PAC Daily Activity Raw Score: 1424  Recommended Adaptive Equipment: Continue to assess.      Diagnosis: Pneumonia [J18.9]  Pertinent Medical History: History reviewed. No pertinent past medical history.      Precautions: falls, droplet plus (COVID-19 Rule Out), O2 via nasal cannula     Home Living: Patient lives alone in a one-floor home. Bathroom Setup: walk-in shower (with seat available)     Prior Level of Function (PLOF): Per patient, she was independent with ADLs, independent with IADLs, and independent with functional mobility prior to this hospitalization. Driving: Yes     Pain Level: No complaints of pain. Cognition: Patient alert and oriented.     Functional Assessment:    Initial Eval Status  Date: 2020 Treatment Status  Date: 4/3/2020 Short Term Goals  Treatment Frequency/Duration: 2-5x/week for 3-7 days PRN   Feeding Setup   Independent   Grooming Min A Setup for face washing and teeth brushing while seated in bedside chair. SBA  (seated)   UB Dressing Min A  Setup   LB Dressing Max A Max A to don bilateral socks and slip-on shoes. Min A - with use of AE, as needed/appropriate   Bathing Max A   Min A - with use of AE/DME, as needed/appropriate   Toileting Dependent for completion of perineal hygiene following bowel movement. Patient with nebsitt catheter currently. Patient with nesbitt catheter currently. Min A   Bed Mobility  Supine-to-Sit: Min A   Supine-to-Sit: Min A SBA   Functional Transfers Sit-to-Stand: Min A to Mod A from EOB Sit-to-Stands: Mod A from EOB  Stand-Pivot Transfer: Mod A (without device) from EOB to bedside chair.  SBA   Functional Mobility Min A to Mod A   (without device) for few steps from EOB to bedside

## 2020-04-03 NOTE — PROGRESS NOTES
sounds  Extremities: no mottling, pulses intact, no edema  Neurologic: awake, alert, no focal deficits  Psychiatric: normal affect, cooperative  ----------------------------------------------------------------------   Nasal cannula oxygen in place   Breath sounds globally diminished   No abdominal TTP, hyperactive bowel sounds essentially in all 4 quadrants    Diagnostic Impressions / Differential Diagnosis / Discussion  Note: no information from this section of the note (from this sentence up to the Assessment and Plan) is to be used for any coding or billing and is purely a way of documenting my thought process regarding this patient's most significant medical issues. *4/2  This morning, patient is borderline tachycardic on 5 L nasal cannula. CBC does show stable anemia with hemoglobin 10.8, thrombocytosis with platelet count ranging between 450 and 500. Metabolic profile unremarkable including renal function and LFTs. Inflammatory markers elevated. Chest x-ray showing worsening multilobar pneumonia with CT showing bilateral groundglass opacities. Regarding blood cultures, 1 of 2 of the original set had returned positive for coagulase-negative staph, likely contaminant. Influenza and respiratory viral panels negative. Infectious diseases following, with antibiotics at their discretion, as is pulmonology. Notes have been reviewed. Retesting for COVID sent yesterday, await results. C. difficile negative. *4/3  O2 weaned down to 3L with vitals showing mild tachypnea. Hgb down about a gram to 9.9 today, Na also down from 136 to 128. Will re-check to determine if real.  COVID returned NEGATIVE.     Assessment and Plan  #1 COVID-19 infection ?ruled out   Diagnostics: Patient currently afebrile     Testing sent out 3/19, re-sent 4/1 - returned 4/2 @ 1229 - NEGATIVE     Repeat testing sent 4/3 - clinical picture c/w COVID-19 infection     Influenza panel NEG     Respiratory viral panel NEG     CXR showing multilobar pneumonia, worsening     CT chest showing bilateral ground glass opacities     CBC shows WBC normal     CMP shows no transaminitis     -------------------------------------------------------------------   Treatment: ID following     Azithromycin completed     Hydroxychloroquine completed     Darunavir-cobicistat     Ribavirin     Doxycycline     Cefepime (ID rec Levaquin but +fluoroquinolone allergy)     Tylenol / ice packs for fever     Demerol if needed for shaking chills refractory to above measures     -------------------------------------------------------------------   Supportive tx: Droplet Plus isolation can be dc'd from my standpoint  Supplemental O2 prn via NC / HFNC / NRB up to 8-10 L on floor     Transfer to ICU if requiring >10 L     Avoid NIPPV / CPAP / BIPAP     Incentive spirometer     Low threshold for endotracheal intubation     Avoid nebulized breathing tx, sub with MDI medications     Steroids not shown to be beneficial with COVID-19     Avoid IVF if possible; if necessary boluses > infusions     Avoid NSAIDs    #2 anemia   Hgb 10.8 g/dL   Baseline appears to be ~12   Monitor, transfuse as needed   4/3 hgb 9.9; re-check this afternoon w further w/u as indicated    Code status            - full  DVT prophylaxis       - Lovenox  Expected disposition - TBD; SNF recommended needs negative testing x2    1st test sent 3/19 - NEGATIVE    2nd test sent 4/1

## 2020-04-03 NOTE — PLAN OF CARE
Problem: Falls - Risk of:  Goal: Will remain free from falls  Description: Will remain free from falls  4/2/2020 2304 by Laurel Beaulieu RN  Outcome: Met This Shift  4/2/2020 1040 by Aissatou Harry RN  Outcome: Met This Shift  Goal: Absence of physical injury  Description: Absence of physical injury  Outcome: Met This Shift     Problem: Pain:  Goal: Pain level will decrease  Description: Pain level will decrease  4/2/2020 2304 by Laurel Beaulieu RN  Outcome: Met This Shift  4/2/2020 1040 by Aissatou Harry RN  Outcome: Met This Shift  Goal: Control of acute pain  Description: Control of acute pain  Outcome: Met This Shift  Goal: Control of chronic pain  Description: Control of chronic pain  Outcome: Met This Shift     Problem: Physical Regulation:  Goal: Ability to maintain a body temperature in the normal range will improve  Description: Ability to maintain a body temperature in the normal range will improve  Outcome: Met This Shift  Goal: Ability to maintain vital signs within normal range will improve  Description: Ability to maintain vital signs within normal range will improve  Outcome: Met This Shift     Problem: Breathing Pattern - Ineffective:  Goal: Ability to achieve and maintain a regular respiratory rate will improve  Description: Ability to achieve and maintain a regular respiratory rate will improve  Outcome: Met This Shift     Problem: Gas Exchange - Impaired:  Goal: Levels of oxygenation will improve  Description: Levels of oxygenation will improve  Outcome: Ongoing

## 2020-04-04 LAB
ANION GAP SERPL CALCULATED.3IONS-SCNC: 12 MMOL/L (ref 7–16)
BUN BLDV-MCNC: 10 MG/DL (ref 8–23)
CALCIUM SERPL-MCNC: 8.9 MG/DL (ref 8.6–10.2)
CHLORIDE BLD-SCNC: 97 MMOL/L (ref 98–107)
CO2: 22 MMOL/L (ref 22–29)
CREAT SERPL-MCNC: 0.7 MG/DL (ref 0.5–1)
GFR AFRICAN AMERICAN: >60
GFR NON-AFRICAN AMERICAN: >60 ML/MIN/1.73
GLUCOSE BLD-MCNC: 113 MG/DL (ref 74–99)
HCT VFR BLD CALC: 29.6 % (ref 34–48)
HEMOGLOBIN: 9.8 G/DL (ref 11.5–15.5)
MCH RBC QN AUTO: 29 PG (ref 26–35)
MCHC RBC AUTO-ENTMCNC: 33.1 % (ref 32–34.5)
MCV RBC AUTO: 87.6 FL (ref 80–99.9)
PDW BLD-RTO: 13.3 FL (ref 11.5–15)
PLATELET # BLD: 424 E9/L (ref 130–450)
PMV BLD AUTO: 9.5 FL (ref 7–12)
POTASSIUM REFLEX MAGNESIUM: 3.6 MMOL/L (ref 3.5–5)
RBC # BLD: 3.38 E12/L (ref 3.5–5.5)
SODIUM BLD-SCNC: 131 MMOL/L (ref 132–146)
WBC # BLD: 10.3 E9/L (ref 4.5–11.5)

## 2020-04-04 PROCEDURE — 6370000000 HC RX 637 (ALT 250 FOR IP): Performed by: INTERNAL MEDICINE

## 2020-04-04 PROCEDURE — 80048 BASIC METABOLIC PNL TOTAL CA: CPT

## 2020-04-04 PROCEDURE — 6370000000 HC RX 637 (ALT 250 FOR IP): Performed by: SPECIALIST

## 2020-04-04 PROCEDURE — 6360000002 HC RX W HCPCS: Performed by: INTERNAL MEDICINE

## 2020-04-04 PROCEDURE — 97110 THERAPEUTIC EXERCISES: CPT

## 2020-04-04 PROCEDURE — 2580000003 HC RX 258: Performed by: SPECIALIST

## 2020-04-04 PROCEDURE — 36415 COLL VENOUS BLD VENIPUNCTURE: CPT

## 2020-04-04 PROCEDURE — 2060000000 HC ICU INTERMEDIATE R&B

## 2020-04-04 PROCEDURE — 99233 SBSQ HOSP IP/OBS HIGH 50: CPT | Performed by: INTERNAL MEDICINE

## 2020-04-04 PROCEDURE — 6360000002 HC RX W HCPCS: Performed by: SPECIALIST

## 2020-04-04 PROCEDURE — 85027 COMPLETE CBC AUTOMATED: CPT

## 2020-04-04 PROCEDURE — 2700000000 HC OXYGEN THERAPY PER DAY

## 2020-04-04 PROCEDURE — 97116 GAIT TRAINING THERAPY: CPT

## 2020-04-04 RX ADMIN — DOXYCYCLINE HYCLATE 100 MG: 100 CAPSULE ORAL at 21:39

## 2020-04-04 RX ADMIN — ACETAMINOPHEN 650 MG: 325 TABLET ORAL at 23:36

## 2020-04-04 RX ADMIN — BENZONATATE 100 MG: 100 CAPSULE ORAL at 17:10

## 2020-04-04 RX ADMIN — DOXYCYCLINE HYCLATE 100 MG: 100 CAPSULE ORAL at 09:01

## 2020-04-04 RX ADMIN — CEFEPIME HYDROCHLORIDE 2 G: 2 INJECTION, POWDER, FOR SOLUTION INTRAVENOUS at 17:10

## 2020-04-04 RX ADMIN — CEFEPIME HYDROCHLORIDE 2 G: 2 INJECTION, POWDER, FOR SOLUTION INTRAVENOUS at 04:59

## 2020-04-04 RX ADMIN — ENOXAPARIN SODIUM 40 MG: 40 INJECTION SUBCUTANEOUS at 09:01

## 2020-04-04 RX ADMIN — SODIUM CHLORIDE: 9 INJECTION, SOLUTION INTRAVENOUS at 21:39

## 2020-04-04 RX ADMIN — LEVOTHYROXINE SODIUM 75 MCG: 75 TABLET ORAL at 09:00

## 2020-04-04 RX ADMIN — CHOLECALCIFEROL TAB 10 MCG (400 UNIT) 800 UNITS: 10 TAB at 09:00

## 2020-04-04 RX ADMIN — Medication 1 TABLET: at 09:01

## 2020-04-04 ASSESSMENT — PAIN SCALES - GENERAL
PAINLEVEL_OUTOF10: 0
PAINLEVEL_OUTOF10: 0

## 2020-04-04 NOTE — PROGRESS NOTES
auscultation bilaterally without respiratory distress  Cardiovascular: regular rate and rhythm without murmur / rub / gallop  Abdominal: soft, nontender, nondistended, normoactive bowel sounds  Extremities: no mottling, pulses intact, no edema  Neurologic: awake, alert, no focal deficits  Psychiatric: normal affect, cooperative  ----------------------------------------------------------------------   Nasal cannula oxygen in place   Breath sounds globally diminished   No abdominal TTP, hyperactive bowel sounds essentially in all 4 quadrants    Assessment and Plan  #1 COVID-19 infection ?ruled out   Diagnostics: Patient currently afebrile     Testing sent out 3/19 - NEG on 4/2     Re-test sent 4/1 - NEG on 4/3     Influenza panel NEG     Respiratory viral panel NEG     CXR showing multilobar pneumonia, worsening     CT chest showing bilateral ground glass opacities     CBC shows WBC normal     CMP shows no transaminitis     -------------------------------------------------------------------   Treatment: ID following     Azithromycin completed     Hydroxychloroquine completed     Darunavir-cobicistat     Ribavirin     Doxycycline     Cefepime (ID rec Levaquin but +fluoroquinolone allergy)     Tylenol / ice packs for fever     Demerol if needed for shaking chills refractory to above measures     -------------------------------------------------------------------   Supportive tx: Droplet Plus isolation can be dc'd from my standpoint  Supplemental O2 prn via NC / HFNC / NRB up to 8-10 L on floor    4/4 - down to 2.5 L     Transfer to ICU if requiring >10 L     Avoid NIPPV / CPAP / BIPAP     Incentive spirometer     Low threshold for endotracheal intubation     Avoid nebulized breathing tx, sub with MDI medications     Steroids not shown to be beneficial with COVID-19     Avoid IVF if possible; if necessary boluses > infusions     Avoid NSAIDs    #2 anemia   Hgb 10.8 g/dL   Baseline appears to be ~12   Monitor,

## 2020-04-04 NOTE — PROGRESS NOTES
[address] [place] [address] [facility]  AMIEIDA  Progress Note    SUBJECTIVE:  Chief Complaint   Patient presents with    Fever     highest of 101.0    Fatigue     fatigue/weakness x 1 week    Cough     occasional dry cough     The patient continues to improve slightly. She is still having a cough. It is nonproductive. No pleuritic chest pain. Tolerating antibiotics. Review of systems:  As stated above in the chief complaint, otherwise negative. Medications:  Scheduled Meds:   doxycycline hyclate  100 mg Oral 2 times per day    cefepime  2 g Intravenous Q12H    levothyroxine  75 mcg Oral Daily    therapeutic multivitamin-minerals  1 tablet Oral Daily    vitamin D3  800 Units Oral Daily    sodium chloride flush  10 mL Intravenous 2 times per day    enoxaparin  40 mg Subcutaneous Daily     Continuous Infusions:   sodium chloride 12.5 mL/hr at 20 2250     PRN Meds:benzocaine-menthol, dextromethorphan-guaiFENesin, artificial tears, benzonatate, polyvinyl alcohol, sodium chloride flush, acetaminophen **OR** acetaminophen, polyethylene glycol, promethazine **OR** ondansetron    OBJECTIVE:  /62   Pulse 96   Temp 98.6 °F (37 °C) (Oral)   Resp 18   Ht 5' 3\" (1.6 m)   Wt 135 lb (61.2 kg)   SpO2 94%   BMI 23.91 kg/m²   Temp  Av.5 °F (36.9 °C)  Min: 98.3 °F (36.8 °C)  Max: 98.7 °F (37.1 °C)  Constitutional: The patient is sitting in bed. She is awake and alert. Oxygen by nasal cannula down to 2L. Skin: Warm and dry. No rashes were noted. HEENT: Round and reactive pupils. Moist mucous membranes. No ulcerations or thrush. Neck: Supple to movements. Chest: Good breath sounds. Some crackles right base posteriorly. Cardiovascular: Heart sounds rhythmic and regular. Abdomen: Positive bowel sounds to auscultation. Benign to palpation. Extremities: No edema.   Lines: peripheral    Laboratory and Tests Review:  Lab Results   Component Value Date    WBC 10.3 2020 ruled out a. Possible C. Difficile  · Possible superimposed bacterial pneumonia    PLAN:  · Completed a minimum of 5 days of Azithromycin  · Completed a minimum of 10 days of Hydroxychloroquine on 4/1/2020  · Continue oral Doxycycline and Cefepime for now  · Protease inhibitor Prezcobix (Darunavir plus cobicistat) and Ribavirin was stopped on 4/3/2020  · Continue Cefepime and oral Doxycycline  · Continue droplet/contact isolation.   Patient is not in a negative pressure room    Slade Diaz  4:26 PM  4/4/2020

## 2020-04-05 LAB
HCT VFR BLD CALC: 30.1 % (ref 34–48)
HEMOGLOBIN: 9.8 G/DL (ref 11.5–15.5)
MCH RBC QN AUTO: 28.7 PG (ref 26–35)
MCHC RBC AUTO-ENTMCNC: 32.6 % (ref 32–34.5)
MCV RBC AUTO: 88.3 FL (ref 80–99.9)
PDW BLD-RTO: 13.4 FL (ref 11.5–15)
PLATELET # BLD: 433 E9/L (ref 130–450)
PMV BLD AUTO: 9.8 FL (ref 7–12)
RBC # BLD: 3.41 E12/L (ref 3.5–5.5)
WBC # BLD: 9.9 E9/L (ref 4.5–11.5)

## 2020-04-05 PROCEDURE — 36415 COLL VENOUS BLD VENIPUNCTURE: CPT

## 2020-04-05 PROCEDURE — 2060000000 HC ICU INTERMEDIATE R&B

## 2020-04-05 PROCEDURE — 2700000000 HC OXYGEN THERAPY PER DAY

## 2020-04-05 PROCEDURE — 6370000000 HC RX 637 (ALT 250 FOR IP): Performed by: INTERNAL MEDICINE

## 2020-04-05 PROCEDURE — 94761 N-INVAS EAR/PLS OXIMETRY MLT: CPT

## 2020-04-05 PROCEDURE — 97116 GAIT TRAINING THERAPY: CPT

## 2020-04-05 PROCEDURE — 97530 THERAPEUTIC ACTIVITIES: CPT

## 2020-04-05 PROCEDURE — 99233 SBSQ HOSP IP/OBS HIGH 50: CPT | Performed by: INTERNAL MEDICINE

## 2020-04-05 PROCEDURE — 2580000003 HC RX 258: Performed by: INTERNAL MEDICINE

## 2020-04-05 PROCEDURE — 97110 THERAPEUTIC EXERCISES: CPT

## 2020-04-05 PROCEDURE — 85027 COMPLETE CBC AUTOMATED: CPT

## 2020-04-05 PROCEDURE — 2580000003 HC RX 258: Performed by: SPECIALIST

## 2020-04-05 PROCEDURE — 6360000002 HC RX W HCPCS: Performed by: INTERNAL MEDICINE

## 2020-04-05 PROCEDURE — 6370000000 HC RX 637 (ALT 250 FOR IP): Performed by: SPECIALIST

## 2020-04-05 PROCEDURE — 97535 SELF CARE MNGMENT TRAINING: CPT

## 2020-04-05 PROCEDURE — 6360000002 HC RX W HCPCS: Performed by: SPECIALIST

## 2020-04-05 RX ORDER — CEFDINIR 300 MG/1
300 CAPSULE ORAL EVERY 12 HOURS SCHEDULED
Status: DISCONTINUED | OUTPATIENT
Start: 2020-04-06 | End: 2020-04-06 | Stop reason: HOSPADM

## 2020-04-05 RX ORDER — CEFDINIR 300 MG/1
300 CAPSULE ORAL EVERY 12 HOURS SCHEDULED
Qty: 14 CAPSULE | Refills: 0 | Status: SHIPPED | OUTPATIENT
Start: 2020-04-06 | End: 2020-04-13

## 2020-04-05 RX ORDER — DOXYCYCLINE HYCLATE 100 MG/1
100 CAPSULE ORAL EVERY 12 HOURS SCHEDULED
Qty: 14 CAPSULE | Refills: 0 | Status: SHIPPED | OUTPATIENT
Start: 2020-04-05 | End: 2020-04-12

## 2020-04-05 RX ADMIN — SODIUM CHLORIDE: 9 INJECTION, SOLUTION INTRAVENOUS at 21:52

## 2020-04-05 RX ADMIN — BENZONATATE 100 MG: 100 CAPSULE ORAL at 12:40

## 2020-04-05 RX ADMIN — ENOXAPARIN SODIUM 40 MG: 40 INJECTION SUBCUTANEOUS at 09:48

## 2020-04-05 RX ADMIN — Medication 1 TABLET: at 09:48

## 2020-04-05 RX ADMIN — ACETAMINOPHEN 650 MG: 325 TABLET ORAL at 12:36

## 2020-04-05 RX ADMIN — CHOLECALCIFEROL TAB 10 MCG (400 UNIT) 800 UNITS: 10 TAB at 09:48

## 2020-04-05 RX ADMIN — LEVOTHYROXINE SODIUM 75 MCG: 75 TABLET ORAL at 05:04

## 2020-04-05 RX ADMIN — CEFEPIME HYDROCHLORIDE 2 G: 2 INJECTION, POWDER, FOR SOLUTION INTRAVENOUS at 17:49

## 2020-04-05 RX ADMIN — ACETAMINOPHEN 650 MG: 325 TABLET ORAL at 21:47

## 2020-04-05 RX ADMIN — Medication 10 ML: at 09:48

## 2020-04-05 RX ADMIN — CEFEPIME HYDROCHLORIDE 2 G: 2 INJECTION, POWDER, FOR SOLUTION INTRAVENOUS at 05:04

## 2020-04-05 RX ADMIN — DOXYCYCLINE HYCLATE 100 MG: 100 CAPSULE ORAL at 21:46

## 2020-04-05 RX ADMIN — DOXYCYCLINE HYCLATE 100 MG: 100 CAPSULE ORAL at 09:48

## 2020-04-05 ASSESSMENT — PAIN SCALES - GENERAL
PAINLEVEL_OUTOF10: 0
PAINLEVEL_OUTOF10: 5
PAINLEVEL_OUTOF10: 0
PAINLEVEL_OUTOF10: 0
PAINLEVEL_OUTOF10: 3

## 2020-04-05 ASSESSMENT — PAIN - FUNCTIONAL ASSESSMENT: PAIN_FUNCTIONAL_ASSESSMENT: ACTIVITIES ARE NOT PREVENTED

## 2020-04-05 ASSESSMENT — PAIN DESCRIPTION - PAIN TYPE: TYPE: ACUTE PAIN

## 2020-04-05 ASSESSMENT — PAIN DESCRIPTION - ORIENTATION: ORIENTATION: LEFT

## 2020-04-05 ASSESSMENT — PAIN DESCRIPTION - ONSET: ONSET: ON-GOING

## 2020-04-05 ASSESSMENT — PAIN DESCRIPTION - FREQUENCY: FREQUENCY: INTERMITTENT

## 2020-04-05 NOTE — PROGRESS NOTES
Physical Therapy    S: In bed on arrival reporting feeling a little better in terms of the breathing from yesterday. Reports having old LT ankle and lower leg trauma which led to need to use show wedge for mobility comfort. Reports unable to bring foot up when asked and reports no AFO use. Lives alone and walks with no AD. States her walking pattern is similar to pattern now but only for few steps and then loosens up to a more normalized gait pattern. Stating to me that her pattern is not loosened up at all due to extended bed usage.      O: Bed mobility: roll indep, S/SBA sup-sit. Sit bal: Indep  Transfers: CGA/SBA   NC02 3L NC  ROM: B/L WNL x LT knee flex 95* and DF trace. MMT: BLE 3-3+/5  Amb: [employer] SBA/S 20' with NC 3L    There-x: BLE's     A: Improving mobility status. Continues with increased pulmonary loading with need for 02. Air hunger/dyspnea during mobility with conversational dyspnea. Needs to use own shoes for transfers and mobility secondary to reported LT ankle instability without usage. Gait pattern antalgic LTLE with reported no antalgic gait pattern prior to admit; states LT ankle is just tight due to prolonged bed status. Gait pattern step to with slow paced to conserve energy. No drifting or LOB with [employer].  Remained in bedside chair with call light and phone within reach.      P: Cont.  POC as suyapa  [facility] 17/24

## 2020-04-05 NOTE — PROGRESS NOTES
[address] [place] [address] [facility]  AMIEIDA  Progress Note    SUBJECTIVE:  Chief Complaint   Patient presents with    Fever     highest of 101.0    Fatigue     fatigue/weakness x 1 week    Cough     occasional dry cough     The patient continues to improve slightly. She is still having a cough. It is nonproductive. No pleuritic chest pain. Tolerating antibiotics. Review of systems:  As stated above in the chief complaint, otherwise negative. Medications:  Scheduled Meds:   doxycycline hyclate  100 mg Oral 2 times per day    cefepime  2 g Intravenous Q12H    levothyroxine  75 mcg Oral Daily    therapeutic multivitamin-minerals  1 tablet Oral Daily    vitamin D3  800 Units Oral Daily    sodium chloride flush  10 mL Intravenous 2 times per day    enoxaparin  40 mg Subcutaneous Daily     Continuous Infusions:   sodium chloride Stopped (20 9626)     PRN Meds:benzocaine-menthol, dextromethorphan-guaiFENesin, artificial tears, benzonatate, polyvinyl alcohol, sodium chloride flush, acetaminophen **OR** acetaminophen, polyethylene glycol, promethazine **OR** ondansetron    OBJECTIVE:  /76   Pulse 88   Temp 98.7 °F (37.1 °C) (Oral)   Resp 16   Ht 5' 3\" (1.6 m)   Wt 135 lb (61.2 kg)   SpO2 95%   BMI 23.91 kg/m²   Temp  Av.3 °F (36.8 °C)  Min: 97.5 °F (36.4 °C)  Max: 98.9 °F (37.2 °C)  Constitutional: The patient is sitting in bed. She is awake and alert. Oxygen by nasal cannula down to 2L. Skin: Warm and dry. No rashes were noted. HEENT: Round and reactive pupils. Moist mucous membranes. No ulcerations or thrush. Neck: Supple to movements. Chest: Good breath sounds. Some crackles right base posteriorly. Cardiovascular: Heart sounds rhythmic and regular. Abdomen: Positive bowel sounds to auscultation. Benign to palpation. Extremities: No edema.   Lines: peripheral    Laboratory and Tests Review:  Lab Results   Component Value Date    WBC 9.9 2020    WBC 10.3 04/04/2020    WBC 10.8 04/03/2020    HGB 9.8 (L) 04/05/2020    HCT 30.1 (L) 04/05/2020    MCV 88.3 04/05/2020     04/05/2020     Lab Results   Component Value Date    NEUTROABS 8.44 (H) 03/27/2020    NEUTROABS 5.50 03/24/2020    NEUTROABS 7.45 (H) 03/20/2020     No results found for: [facility]  Lab Results   Component Value Date    ALT 28 03/30/2020    AST 27 03/30/2020    ALKPHOS 137 (H) 03/30/2020    BILITOT 0.8 03/30/2020     Lab Results   Component Value Date     04/04/2020    K 3.6 04/04/2020    CL 97 04/04/2020    CO2 22 04/04/2020    BUN 10 04/04/2020    CREATININE 0.7 04/04/2020    CREATININE 0.6 04/03/2020    CREATININE 0.7 04/02/2020    GFRAA >60 04/04/2020    LABGLOM >60 04/04/2020    GLUCOSE 113 04/04/2020    PROT 7.4 03/30/2020    LABALBU 2.8 03/30/2020    CALCIUM 8.9 04/04/2020    BILITOT 0.8 03/30/2020    ALKPHOS 137 03/30/2020    AST 27 03/30/2020    ALT 28 03/30/2020     Lab Results   Component Value Date    CRP 27.6 (H) 03/30/2020    CRP 21.2 (H) 03/24/2020    CRP 27.2 (H) 03/21/2020     Lab Results   Component Value Date    SEDRATE 110 (H) 03/30/2020    SEDRATE 90 (H) 03/24/2020    SEDRATE 65 (H) 03/21/2020     Radiology:  Bilateral patchy infiltrates. They seem to be consolidated more so than before    Microbiology:   COVID19 NP swab (Sent to [facility]) 3/19/2020: Not detected  COVID19 NP swab 4/1/20 ([facility] lab): Not detected  Blood cultures 3/19/2020 CoNS in 1 of 4 bottles  Respiratory culture: Ordered but not sent  Urine culture 3/20/2020 <10,000 GPO  Insert urine antigens  Respiratory panel: Negative  Rapid influenza: Negative  Procalcitonin: 0.13    ASSESSMENT:  · Probable SARS-COV-2/COVID19 infection. Result came back as not detected  · Probable viral pneumonia. Stable O2 requirements have not increased  · Fever associated to the above, improving  · CoNS bacteremia.   Contaminant  · Diarrhea secondary to COVID19 versus antibiotic associated diarrhe C. difficile infection ruled out a. Possible C. Difficile  · Possible superimposed bacterial pneumonia    PLAN:  · Completed a minimum of 5 days of Azithromycin  · Completed a minimum of 10 days of Hydroxychloroquine on 4/1/2020  · Continue oral Doxycycline and Cefepime. Last dose of Cefepime tonight. Tomorrow she can start Cefdinir. If she tolerates this she can be discharged with 7 days of antibiotics  · Protease inhibitor Prezcobix (Darunavir plus cobicistat) and Ribavirin was stopped on 4/3/2020  · Continue Cefepime and oral Doxycycline  · Continue droplet/contact isolation.   Patient is not in a negative pressure room    Margaret Leiva  4:00 PM  4/5/2020

## 2020-04-05 NOTE — PROGRESS NOTES
Called and notified Farhat Draper (pt's sister) re: possible discharge. Home care and oxygen orders in. Will await infectious dz rec.

## 2020-04-05 NOTE — PROGRESS NOTES
Subjective  Chief Complaint - fever, fatigue, cough    History of Present Illness  4/2  This is Dr. Natanael Serrano assuming care of this patient. This patient is a previously healthy 41-year-old female who was admitted to this facility on 3/19 with complaints of cough, fevers, and fatigue. She had been swabbed for COVID-19 infection on presentation, though with results still not back, she was re-swabbed yesterday 4/1, though clinical picture is consistent with this infection. Patient was seen this afternoon at bedside. Shortness of breath remains, she is dyspneic even with conversation though is in no acute distress. Her bigger concern, however, is her continued diarrhea, which she says is not really any better than yesterday. No abdominal pain, nausea, emesis, but she says she just cannot control when she goes and she is embarrassed that staff asked to come clean her up. No fevers or chills or other new issues today. 4/3  Seen, lying in bed, still dyspneic, still with diarrhea. However, less dyspneic w conversation. Denies melena, hematochezia, hematuria - hgb was down to 9.9 today. 4/4  Seen, up in chair with therapy in the room. A bit winded from walking but no actual distress. Cough w talking. Says diarrhea has resolved. Still weak. No new issues. 4/5  Seen, up in chair, has worked w therapy today. Doing better, color better, appears less weak overall. Denies new symptoms. Planning on going home; would recommend home health and home O2; will write orders. Otherwise, still on doxy and cefepime and with respect to ID's judgment will maintain pt here overnight w possible discharge tomorrow based on clinical course.     Review of Systems - 12-point review of systems has been reviewed and is otherwise negative except as listed in the HPI    Objective  Physical Exam - physical exam normal as below EXCEPT FOR ABNORMAL FINDINGS as listed immediately below line, which supersede the normal findings listed  Vitals: /76   Pulse 88   Temp 98.7 °F (37.1 °C) (Oral)   Resp 16   Ht 5' 3\" (1.6 m)   Wt 135 lb (61.2 kg)   SpO2 95%   BMI 23.91 kg/m²   General: well-developed, well-nourished, no acute distress, cooperative  Skin: warm, dry, intact, normal color without cyanosis  HEENT: normocephalic, atraumatic, mucous membranes normal  Respiratory: clear to auscultation bilaterally without respiratory distress  Cardiovascular: regular rate and rhythm without murmur / rub / gallop  Abdominal: soft, nontender, nondistended, normoactive bowel sounds  Extremities: no mottling, pulses intact, no edema  Neurologic: awake, alert, no focal deficits  Psychiatric: normal affect, cooperative  ----------------------------------------------------------------------   Nasal cannula oxygen in place   Breath sounds globally diminished   No abdominal TTP, hyperactive bowel sounds essentially in all 4 quadrants   Less weak today, ambulating better    Assessment and Plan  #1 COVID-19 infection ?ruled out   Diagnostics: Patient currently afebrile     Testing sent out 3/19 - NEG on 4/2     Re-test sent 4/1 - NEG on 4/3     Influenza panel NEG     Respiratory viral panel NEG     CXR showing multilobar pneumonia, worsening     CT chest showing bilateral ground glass opacities     CBC shows WBC normal     CMP shows no transaminitis     -------------------------------------------------------------------   Treatment: ID following     Azithromycin completed     Hydroxychloroquine completed     Darunavir-cobicistat     Ribavirin completed     Doxycycline - day 4     Cefepime (ID rec Levaquin but +fluoroquinolone allergy) - day 4     Tylenol / ice packs for fever     Demerol if needed for shaking chills refractory to above measures     -------------------------------------------------------------------   Supportive tx: Droplet Plus isolation can be dc'd from my standpoint  Supplemental O2 prn via NC / HFNC / NRB up to 8-10 L on floor    4/4 - down to 2.5 L     Transfer to ICU if requiring >10 L     Avoid NIPPV / CPAP / BIPAP     Incentive spirometer     Low threshold for endotracheal intubation     Avoid nebulized breathing tx, sub with MDI medications     Steroids not shown to be beneficial with COVID-19     Avoid IVF if possible; if necessary boluses > infusions     Avoid NSAIDs    #2 anemia   Hgb 10.8 g/dL   Baseline appears to be ~12   Monitor, transfuse as needed   4/3 hgb 9.9; re-check this afternoon w further w/u as indicated    Code status              - full  DVT prophylaxis       - Lovenox  Expected disposition - home potentially Monday if ID feels this is safe

## 2020-04-05 NOTE — PROGRESS NOTES
[facility] Quality Flow/Interdisciplinary Rounds Progress Note        Quality Flow Rounds held on April 5, 2020    Disciplines Attending:  Bedside Nurse, ,  and Nursing Unit Leadership    Ananth Brush was admitted on 3/19/2020  6:49 PM    Anticipated Discharge Date:  Expected Discharge Date: 04/06/20    Disposition:    Alek Score:  Alek Scale Score: 19    Readmission Risk              Risk of Unplanned Readmission:        10           Discussed patient goal for the day, patient clinical progression, and barriers to discharge. The following Goal(s) of the Day/Commitment(s) have been identified:  Monitor gait function and respiratory status. Possible discharge.        Giovanni Jeter  April 5, 2020

## 2020-04-05 NOTE — PROGRESS NOTES
Referral made to [facility]. Patient demographic and insurance information given to [employer]. She will call patient.

## 2020-04-05 NOTE — PROGRESS NOTES
RA at rest SpO2 95%. RA while ambulating SpO2 85%. Applied 4L O2, SpO2 increased to 93% with ambulation.

## 2020-04-05 NOTE — DISCHARGE SUMMARY
Subjective  Chief Complaint - fever, fatigue, cough    History of Present Illness  4/2  This is Dr. Dharmesh Wakefield assuming care of this patient. This patient is a previously healthy 67-year-old female who was admitted to this facility on 3/19 with complaints of cough, fevers, and fatigue. She had been swabbed for COVID-19 infection on presentation, though with results still not back, she was re-swabbed yesterday 4/1, though clinical picture is consistent with this infection. Patient was seen this afternoon at bedside. Shortness of breath remains, she is dyspneic even with conversation though is in no acute distress. Her bigger concern, however, is her continued diarrhea, which she says is not really any better than yesterday. No abdominal pain, nausea, emesis, but she says she just cannot control when she goes and she is embarrassed that staff asked to come clean her up. No fevers or chills or other new issues today. 4/3  Seen, lying in bed, still dyspneic, still with diarrhea. However, less dyspneic w conversation. Denies melena, hematochezia, hematuria - hgb was down to 9.9 today. 4/4  Seen, up in chair with therapy in the room. A bit winded from walking but no actual distress. Cough w talking. Says diarrhea has resolved. Still weak. No new issues. 4/5  Seen, up in chair, has worked w therapy today. Doing better, color better, appears less weak overall. Denies new symptoms. Planning on going home; would recommend home health and home O2; will write orders. Otherwise, still on doxy and cefepime (day 4 for both) and if cefepime can be DC'd / pending ID recs, pt could be discharged home today.     Review of Systems - 12-point review of systems has been reviewed and is otherwise negative except as listed in the HPI    Objective  Physical Exam - physical exam normal as below EXCEPT FOR ABNORMAL FINDINGS as listed immediately below line, which supersede the normal findings listed  Vitals: BP (!) 102/52   Pulse 89   Temp 97.5 °F (36.4 °C) (Oral)   Resp 18   Ht 5' 3\" (1.6 m)   Wt 135 lb (61.2 kg)   SpO2 92%   BMI 23.91 kg/m²   General: well-developed, well-nourished, no acute distress, cooperative  Skin: warm, dry, intact, normal color without cyanosis  HEENT: normocephalic, atraumatic, mucous membranes normal  Respiratory: clear to auscultation bilaterally without respiratory distress  Cardiovascular: regular rate and rhythm without murmur / rub / gallop  Abdominal: soft, nontender, nondistended, normoactive bowel sounds  Extremities: no mottling, pulses intact, no edema  Neurologic: awake, alert, no focal deficits  Psychiatric: normal affect, cooperative  ----------------------------------------------------------------------   Nasal cannula oxygen in place   Breath sounds globally diminished   No abdominal TTP, hyperactive bowel sounds essentially in all 4 quadrants   Less weak today, ambulating better    Assessment and Plan  #1 COVID-19 infection ?ruled out   Diagnostics: Patient currently afebrile     Testing sent out 3/19 - NEG on 4/2     Re-test sent 4/1 - NEG on 4/3     Influenza panel NEG     Respiratory viral panel NEG     CXR showing multilobar pneumonia, worsening     CT chest showing bilateral ground glass opacities     CBC shows WBC normal     CMP shows no transaminitis     -------------------------------------------------------------------   Treatment: ID following     Azithromycin completed     Hydroxychloroquine completed     Darunavir-cobicistat     Ribavirin completed     Doxycycline - day 4     Cefepime (ID rec Levaquin but +fluoroquinolone allergy) - day 4     Tylenol / ice packs for fever     Demerol if needed for shaking chills refractory to above measures     -------------------------------------------------------------------   Supportive tx: Droplet Plus isolation can be dc'd from my standpoint  Supplemental O2 prn via NC / HFNC / NRB up to 8-10 L on floor    4/4 - down to

## 2020-04-06 VITALS
WEIGHT: 135 LBS | OXYGEN SATURATION: 92 % | BODY MASS INDEX: 23.92 KG/M2 | HEART RATE: 80 BPM | SYSTOLIC BLOOD PRESSURE: 116 MMHG | HEIGHT: 63 IN | TEMPERATURE: 98.3 F | RESPIRATION RATE: 18 BRPM | DIASTOLIC BLOOD PRESSURE: 60 MMHG

## 2020-04-06 LAB
HCT VFR BLD CALC: 29.4 % (ref 34–48)
HEMOGLOBIN: 9.5 G/DL (ref 11.5–15.5)
MCH RBC QN AUTO: 28.6 PG (ref 26–35)
MCHC RBC AUTO-ENTMCNC: 32.3 % (ref 32–34.5)
MCV RBC AUTO: 88.6 FL (ref 80–99.9)
PDW BLD-RTO: 13.3 FL (ref 11.5–15)
PLATELET # BLD: 428 E9/L (ref 130–450)
PMV BLD AUTO: 9.8 FL (ref 7–12)
RBC # BLD: 3.32 E12/L (ref 3.5–5.5)
WBC # BLD: 8.7 E9/L (ref 4.5–11.5)

## 2020-04-06 PROCEDURE — 6360000002 HC RX W HCPCS: Performed by: INTERNAL MEDICINE

## 2020-04-06 PROCEDURE — 99239 HOSP IP/OBS DSCHRG MGMT >30: CPT | Performed by: INTERNAL MEDICINE

## 2020-04-06 PROCEDURE — 36415 COLL VENOUS BLD VENIPUNCTURE: CPT

## 2020-04-06 PROCEDURE — 2700000000 HC OXYGEN THERAPY PER DAY

## 2020-04-06 PROCEDURE — 6370000000 HC RX 637 (ALT 250 FOR IP): Performed by: SPECIALIST

## 2020-04-06 PROCEDURE — 6370000000 HC RX 637 (ALT 250 FOR IP): Performed by: INTERNAL MEDICINE

## 2020-04-06 PROCEDURE — 97530 THERAPEUTIC ACTIVITIES: CPT

## 2020-04-06 PROCEDURE — 85027 COMPLETE CBC AUTOMATED: CPT

## 2020-04-06 RX ADMIN — Medication 1 TABLET: at 08:15

## 2020-04-06 RX ADMIN — CHOLECALCIFEROL TAB 10 MCG (400 UNIT) 800 UNITS: 10 TAB at 08:16

## 2020-04-06 RX ADMIN — LEVOTHYROXINE SODIUM 75 MCG: 75 TABLET ORAL at 08:15

## 2020-04-06 RX ADMIN — CEFDINIR 300 MG: 300 CAPSULE ORAL at 08:15

## 2020-04-06 RX ADMIN — ENOXAPARIN SODIUM 40 MG: 40 INJECTION SUBCUTANEOUS at 08:16

## 2020-04-06 RX ADMIN — DOXYCYCLINE HYCLATE 100 MG: 100 CAPSULE ORAL at 08:16

## 2020-04-06 NOTE — PLAN OF CARE
Problem: Falls - Risk of:  Goal: Will remain free from falls  Description: Will remain free from falls  Outcome: Met This Shift     Problem: Pain:  Goal: Pain level will decrease  Description: Pain level will decrease  Outcome: Met This Shift     Problem: Physical Regulation:  Goal: Ability to maintain a body temperature in the normal range will improve  Description: Ability to maintain a body temperature in the normal range will improve  Outcome: Met This Shift     Problem: Gas Exchange - Impaired:  Goal: Levels of oxygenation will improve  Description: Levels of oxygenation will improve  Outcome: Met This Shift

## 2020-04-06 NOTE — CARE COORDINATION
Call to both [facility] and [facility] Elisa Verde to notify or probable discharge today. Both accepted referral and will follow, making necessary arrangements for delivery of services. Spoke with patient in room via phone. She confirmed plan for transition to her sister's home and adds she feels she is ready for discharge. Await pcp rounding. Kane Baer.  Ginger, MSN, RN  [facility] Case Management  [phone]

## 2020-04-06 NOTE — PROGRESS NOTES
Physical Therapy  Facility/Department: [address] INTERMEDIATE  Daily Treatment Note  NAME: Sally Davis  : 1945  MRN: [id number]    Date of Service: 2020       REQUIRES PT FOLLOW UP: Yes     Patient Diagnosis(es): The encounter diagnosis was Pneumonia due to infectious organism, unspecified laterality, unspecified part of lung.     has no past medical history on file. has no past surgical history on file. Evaluating Therapist: Mana Medina PT      Referring Provider:  Jimena Hull MD     Room #: 061   DIAGNOSIS:  PNA   PRECAUTIONS: falls, droplet plus, ) 2 @ 4 LNC , continuous pulse ox monitoring      Social:  Pt lives alone  in a  1  floor plan  Prior to admission pt walked with  No AD. Pt reports she needs wedged shoed to walk due to previous L ankle injury        Initial Evaluation  Date: 2020 Treatment  2020      Short Term/ Long Term   Goals   Was pt agreeable to Eval/treatment? yes  With encouragement      Does pt have pain?  no, reports weakness  And discomfort from cough        Bed Mobility  Rolling:  Min assist   Supine to sit: min assist   Sit to supine: Mod assist   Scooting:  Min assist Supine <> sit : S/SBA   SBA   Transfers Sit to stand:  Min assist   Stand to sit:  Min assist   Stand pivot:  NT   Sit <> stand : SBA   SBA    Ambulation     4 side steps  with no AD  with min/mod assist   40 feet x 1 with ww SBA   50 feet with  AAD  with  SBA          Stair negotiation: ascended and descended NT     4  steps with  1 rail with  SBA  ( ad able    LE ROM  Grossly WFL        LE strength  3+ to 4-/ 5        AM- PAC RAW score   15/ 24   17/ 24               Pt is alert and Oriented x  4       Balance: SBA with gait   Endurance: decreased   Bed/Chair alarm: Yes      ASSESSMENT    Pt displays functional ability as noted in the objective portion of this treatment            Treatment/Education:    Cues for safe technique with all mobility. Cues for hand placement with  ww. Suggested ww use at home for gait safety, Needed assist to don shoes prior to mobility. Pt refused to sit in chair for lunch stating the chair aggravates her sciatica. RTB after mobility   Pt educated on fall risk, proper breathing technique        Patient response to education:   Pt verbalized understanding Pt demonstrated skill Pt requires further education in this area   x With cues  x         Comments:  Pt left in bed  after session, with call light in reach. Set up with lunch                 PLAN    PT care will be provided in accordance with the objectives noted above. Whenever appropriate, clear delegation orders will be provided for nursing staff. Exercises and functional mobility practice will be used as well as appropriate assistive devices or modalities to obtain goals. Patient and family education will also be administered as needed.     Frequency of treatments will be 2-5x/week x  10-14 days. Con't with PT POC. Pt nearing discharge per physician's notes      Time in: 1138  Time out:  1202         Evaluation Time includes thorough review of current medical information, gathering information on past medical history/social history and prior level of function, completion of standardized testing/informal observation of tasks, assessment of data and education on plan of care and goals.     CPT codes:  []? Low Complexity PT evaluation [id number]  []? Moderate Complexity PT evaluation 47215  []? High Complexity PT evaluation [id number]  []? PT Re-evaluation [id number]  []? Gait training 38550  minutes  [x]? Therapeutic activities 88008 23  minutes  []? Therapeutic exercises 51853  minutes  []?  Neuromuscular reeducation 44540  minutes         [place] 18 number:  PT [id number]

## 2020-04-06 NOTE — PROGRESS NOTES
Stan De La Rosa M.D.,[facility]  Ulisses Melendrez D.O., F.A.C.OSURYA., Travis Segundo M.D. Bailey Mendes M.D., Yudelka Watson M.D. Adal Piper D.O. Daily Pulmonary Progress Note    Patient:  Jude Lopez 75 y.o. female MRN: [id number]     Date of Service: 4/6/2020      Synopsis     We are following patient for respiratory distress with hypoxia viral pneumonitis, probable COVID 19 pneumonitis, results came back not detected    \"CC\" shortness of breath, cough    Code status: Full      Subjective      Patient was seen and examined. Lying in bed in no acute distress. Oxygen down to 2 L. Saturation at rest 93%. She still complains of overall weakness and fatigue. Dry cough present less dyspnea. She has no mucus production. Able to get up and move around in the room. Possible discharge today. Review of Systems:  Constitutional: Denies fever, weight loss, night sweats, and fatigue  Skin: Denies pigmentation, dark lesions, and rashes   HEENT: Denies hearing loss, tinnitus, ear drainage, epistaxis, sore throat, and hoarseness. Cardiovascular: Denies palpitations, chest pain, and chest pressure.   Respiratory: As above  Gastrointestinal: Denies nausea, vomiting, poor appetite, diarrhea, heartburn or reflux  Genitourinary: Denies dysuria, frequency, urgency or hematuria  Musculoskeletal: Denies myalgias, muscle weakness, and bone pain  Neurological: Denies dizziness, vertigo, headache, and focal weakness  Psychological: Denies anxiety and depression  Endocrine: Denies heat intolerance and cold intolerance  Hematopoietic/Lymphatic: Denies bleeding problems and blood transfusions    24-hour events:  None    Objective   Vitals: /60   Pulse 80   Temp 98.3 °F (36.8 °C)   Resp 18   Ht 5' 3\" (1.6 m)   Wt 135 lb (61.2 kg)   SpO2 92%   BMI 23.91 kg/m²     I/O:    Intake/Output Summary (Last 24 hours) at 4/6/2020 1059  Last data filed at 4/6/2020 0545  Gross per 24 hour   Intake 120 ml   Output 975 ml   Net -855 ml       Vent Information  FiO2 : 21 %         CURRENT MEDS :  Scheduled Meds:   cefdinir  300 mg Oral 2 times per day    doxycycline hyclate  100 mg Oral 2 times per day    levothyroxine  75 mcg Oral Daily    therapeutic multivitamin-minerals  1 tablet Oral Daily    vitamin D3  800 Units Oral Daily    sodium chloride flush  10 mL Intravenous 2 times per day    enoxaparin  40 mg Subcutaneous Daily       Physical Exam:  General Appearance: appears comfortable in no acute distress. HEENT: Normocephalic atraumatic without obvious abnormality   Neck: Lips, mucosa, and tongue normal.  Supple, symmetrical, trachea midline, no adenopathy;thyroid:  no enlargement/tenderness/nodules or JVD. Lung: Breath sounds few basilar crackles. Respirations   unlabored. Symmetrical expansion. Heart: RRR, normal S1, S2. No MRG  Abdomen: Soft, NT, ND. BS present x 4 quadrants. No bruit or organomegaly. Extremities: Pedal pulses 2+ symmetric b/l. Extremities normal, no cyanosis, clubbing, or edema. Musculokeletal: No joint swelling, no muscle tenderness. ROM normal in all joints of extremities. Neurologic: Mental status: Alert and Oriented X3 . Pertinent/ New Labs and Imaging Studies     Imaging Personally Reviewed:  4/1/2020     The heart is unremarkable. The lung fields demonstrate progression of patchy consolidative   opacities in the mid and lower lung fields bilaterally. The aorta is unremarkable.           Impression   Bilateral patchy consolidative opacities compatible with   multifocal or viral pneumonia, with interval worsening.       The study was dictated by Krystina Tao PA-C and Gonzalo Vo MD   reviewed and concurred with the findings.               3/19/2020 CT chest without contrast  Impression       1.  Patchy areas of groundglass opacity with interlobular septal   thickening seen within the bilateral upper and to a lesser extent   lower lobes, consistent with multilobar pneumonia.                          Labs:  Lab Results   Component Value Date    WBC 8.7 04/06/2020    HGB 9.5 04/06/2020    HCT 29.4 04/06/2020    MCV 88.6 04/06/2020    MCH 28.6 04/06/2020    MCHC 32.3 04/06/2020    RDW 13.3 04/06/2020     04/06/2020    MPV 9.8 04/06/2020     Lab Results   Component Value Date     04/04/2020    K 3.6 04/04/2020    CL 97 04/04/2020    CO2 22 04/04/2020    BUN 10 04/04/2020    CREATININE 0.7 04/04/2020    LABALBU 2.8 03/30/2020    CALCIUM 8.9 04/04/2020    GFRAA >60 04/04/2020    LABGLOM >60 04/04/2020     No results found for: PROTIME, INR  No results for input(s): PROBNP in the last 72 hours. No results for input(s): PROCAL in the last 72 hours. This SmartLink has not been configured with any valid records. Micro:  No results for input(s): CULTRESP in the last 72 hours. No results for input(s): LABGRAM in the last 72 hours. No results for input(s): LEGUR in the last 72 hours. No results for input(s): STREPNEUMAGU in the last 72 hours. No results for input(s): LP1UAG in the last 72 hours. Repeat COVID 19 testing 4/1/2020 not detected   Assessment:    1. Acute respiratory failure with hypoxia  2. Viral Pneumonia-repeat COVID testing not detected on 4/1/2020  3. Diarrhea, c diff negative  4. CONS bacteremia-contaminant    Plan:   1. Oxygen therapy  2 L high flow nasal cannula- ordered for dc 24/7  2. Completed a minimum of 10 days of Hydroxychloroquine on 4/1/2020  3. Omnicef and doxycycline per ID service  4. COVID 19 testing resent on 4/1/2020-not detected  5. Chest x-ray 4/1/2020  6. Dvt, GI prophylaxis   7. Ok to dc from a pulmonary perspective, follow up routed to office  This plan of care was reviewed in collaboration with Dr. Justina Dugan  Electronically signed by JED Duron CNP on 4/6/2020 at 10:54 AM       I personally saw, examined, and cared for the patient. Labs, medications, radiographs reviewed.  I agree with history exam and plans detailed in NP note.       Electronically signed by Dinora Buckley DO on 4/6/2020 at 3:29 PM

## 2020-04-06 NOTE — DISCHARGE SUMMARY
mouth every 12 hours for 7 days        CONTINUE taking these medications    acetaminophen 500 MG tablet  Commonly known as:  TYLENOL     Loretta-C Tabs     ESTROPIPATE PO     levothyroxine 75 MCG tablet  Commonly known as:  SYNTHROID     SUMAtriptan 50 MG tablet  Commonly known as:  IMITREX     therapeutic multivitamin-minerals tablet     vitamin D3 10 MCG (400 UNIT) Tabs tablet  Commonly known as:  CHOLECALCIFEROL        STOP taking these medications    ibuprofen 200 MG tablet  Commonly known as:  ADVIL;MOTRIN     sulfamethoxazole-trimethoprim 800-160 MG per tablet  Commonly known as:  BACTRIM DS;SEPTRA DS           Where to Get Your Medications      These medications were sent to [facility] [id number] #[id number] - [place], [address] [facility] [address] -  [phone] [facility] [place] [phone] [address] [facility] [facility] [address], [place] [address]    Phone:  [phone]   · cefdinir 300 MG capsule  · doxycycline hyclate 100 MG capsule           Note that more than 30 minutes was spent in preparing discharge papers, discussing discharge with patient, medication review, etc.    Signed:  Electronically signed by Jerry Barbosa MD on 4/6/2020 at 3:24 PM

## 2020-04-06 NOTE — PROGRESS NOTES
[address] [place] [address] [facility]  Progress Note    SUBJECTIVE:  Chief Complaint   Patient presents with    Fever     highest of 101.0    Fatigue     fatigue/weakness x 1 week    Cough     occasional dry cough     The patient continues to improve slightly. She is still having a cough. It is nonproductive. No pleuritic chest pain. Tolerating antibiotics. Review of systems:  As stated above in the chief complaint, otherwise negative. Medications:  Scheduled Meds:   cefdinir  300 mg Oral 2 times per day    doxycycline hyclate  100 mg Oral 2 times per day    levothyroxine  75 mcg Oral Daily    therapeutic multivitamin-minerals  1 tablet Oral Daily    vitamin D3  800 Units Oral Daily    sodium chloride flush  10 mL Intravenous 2 times per day    enoxaparin  40 mg Subcutaneous Daily     Continuous Infusions:   sodium chloride Stopped (20 0000)     PRN Meds:benzocaine-menthol, dextromethorphan-guaiFENesin, artificial tears, benzonatate, polyvinyl alcohol, sodium chloride flush, acetaminophen **OR** acetaminophen, polyethylene glycol, promethazine **OR** ondansetron    OBJECTIVE:  /60   Pulse 80   Temp 98.3 °F (36.8 °C)   Resp 18   Ht 5' 3\" (1.6 m)   Wt 135 lb (61.2 kg)   SpO2 92%   BMI 23.91 kg/m²   Temp  Av.6 °F (37 °C)  Min: 98.3 °F (36.8 °C)  Max: 98.9 °F (37.2 °C)  Constitutional: The patient is sitting in bed. She is awake and alert. Oxygen by nasal cannula down to 2L. Skin: Warm and dry. No rashes were noted. HEENT: Round and reactive pupils. Moist mucous membranes. No ulcerations or thrush. Neck: Supple to movements. Chest: Good breath sounds. Some crackles right base posteriorly. Cardiovascular: Heart sounds rhythmic and regular. Abdomen: Positive bowel sounds to auscultation. Benign to palpation. Extremities: No edema.   Lines: peripheral    Laboratory and Tests Review:  Lab Results   Component Value Date    WBC 8.7 2020    WBC 9.9 04/05/2020    WBC 10.3 04/04/2020    HGB 9.5 (L) 04/06/2020    HCT 29.4 (L) 04/06/2020    MCV 88.6 04/06/2020     04/06/2020     Lab Results   Component Value Date    NEUTROABS 8.44 (H) 03/27/2020    NEUTROABS 5.50 03/24/2020    NEUTROABS 7.45 (H) 03/20/2020     No results found for: CRPHS  Lab Results   Component Value Date    ALT 28 03/30/2020    AST 27 03/30/2020    ALKPHOS 137 (H) 03/30/2020    BILITOT 0.8 03/30/2020     Lab Results   Component Value Date     04/04/2020    K 3.6 04/04/2020    CL 97 04/04/2020    CO2 22 04/04/2020    BUN 10 04/04/2020    CREATININE 0.7 04/04/2020    CREATININE 0.6 04/03/2020    CREATININE 0.7 04/02/2020    GFRAA >60 04/04/2020    LABGLOM >60 04/04/2020    GLUCOSE 113 04/04/2020    PROT 7.4 03/30/2020    LABALBU 2.8 03/30/2020    CALCIUM 8.9 04/04/2020    BILITOT 0.8 03/30/2020    ALKPHOS 137 03/30/2020    AST 27 03/30/2020    ALT 28 03/30/2020     Lab Results   Component Value Date    CRP 27.6 (H) 03/30/2020    CRP 21.2 (H) 03/24/2020    CRP 27.2 (H) 03/21/2020     Lab Results   Component Value Date    SEDRATE 110 (H) 03/30/2020    SEDRATE 90 (H) 03/24/2020    SEDRATE 65 (H) 03/21/2020     Radiology:  Bilateral patchy infiltrates. They seem to be consolidated more so than before    Microbiology:   COVID19 NP swab (Sent to [facility]) 3/19/2020: Not detected  COVID19 NP swab 4/1/20 ([email or web address] lab): Not detected  Blood cultures 3/19/2020 CoNS in 1 of 4 bottles  Respiratory culture: Ordered but not sent  Urine culture 3/20/2020 <10,000 GPO  Insert urine antigens  Respiratory panel: Negative  Rapid influenza: Negative  Procalcitonin: 0.13    ASSESSMENT:  · Probable SARS-COV-2/COVID19 infection. Result came back as not detected  · Probable viral pneumonia. Stable O2 requirements have not increased  · Fever associated to the above, improving  · CoNS bacteremia. Contaminant  · Diarrhea secondary to COVID19 versus antibiotic associated diarrhe C. difficile infection ruled out a. Possible C.  Difficile  · Possible superimposed bacterial pneumonia    PLAN:  · The patient can be discharged home on 7 days of Cefdinir and Doxycycline  · Instructed to call PCP for a follow-up  · Patient instructed to maintain social distancing, although she had a negative NP swab for COVID-19    Slade Diaz  3:26 PM  4/6/2020

## 2020-04-06 NOTE — PROGRESS NOTES
[facility] Quality Flow/Interdisciplinary Rounds Progress Note        Quality Flow Rounds held on April 6, 2020    Disciplines Attending:  Bedside Nurse, ,  and Nursing Unit Leadership    Hilda Chavira was admitted on 3/19/2020  6:49 PM    Anticipated Discharge Date:  Expected Discharge Date: 04/06/20    Disposition:    Alek Score:  Alek Scale Score: 19    Readmission Risk              Risk of Unplanned Readmission:        13           Discussed patient goal for the day, patient clinical progression, and barriers to discharge. The following Goal(s) of the Day/Commitment(s) have been identified:  Discharge today.        Cyndi Abrams  April 6, 2020

## 2021-01-28 ENCOUNTER — IMMUNIZATION (OUTPATIENT)
Dept: PRIMARY CARE CLINIC | Age: 76
End: 2021-01-28
Payer: MEDICARE

## 2021-01-28 PROCEDURE — 91300 COVID-19, PFIZER VACCINE 30MCG/0.3ML DOSE: CPT | Performed by: NURSE PRACTITIONER

## 2021-01-28 PROCEDURE — 0001A COVID-19, PFIZER VACCINE 30MCG/0.3ML DOSE: CPT | Performed by: NURSE PRACTITIONER

## 2021-02-18 ENCOUNTER — IMMUNIZATION (OUTPATIENT)
Dept: PRIMARY CARE CLINIC | Age: 76
End: 2021-02-18
Payer: MEDICARE

## 2021-02-18 PROCEDURE — 0002A COVID-19, PFIZER VACCINE 30MCG/0.3ML DOSE: CPT | Performed by: PHYSICIAN ASSISTANT

## 2021-02-18 PROCEDURE — 91300 COVID-19, PFIZER VACCINE 30MCG/0.3ML DOSE: CPT | Performed by: PHYSICIAN ASSISTANT

## 2021-12-30 ENCOUNTER — HOSPITAL ENCOUNTER (OUTPATIENT)
Dept: PULMONOLOGY | Age: 76
Discharge: HOME | End: 2021-12-30
Payer: MEDICARE

## 2021-12-30 DIAGNOSIS — R06.00 DYSPNEA, UNSPECIFIED TYPE: ICD-10-CM

## 2021-12-30 DIAGNOSIS — B34.2 CORONAVIRUS INFECTION, UNSPECIFIED: ICD-10-CM

## 2021-12-30 PROCEDURE — 94729 DIFFUSING CAPACITY: CPT

## 2021-12-30 PROCEDURE — 94060 EVALUATION OF WHEEZING: CPT

## 2021-12-30 PROCEDURE — 94726 PLETHYSMOGRAPHY LUNG VOLUMES: CPT

## 2022-01-14 NOTE — PROCEDURES
[address] [address]                               PULMONARY FUNCTION    PATIENT NAME: Hawa Davis                    :        1945  MED REC NO:   [id number]                            ROOM:  ACCOUNT NO:   [de-identified]                           ADMIT DATE: 2021  PROVIDER:     Barbra Ramirez MD    DATE OF PROCEDURE:  2021    INDICATIONS:  A 44-year-old female, 63 inches, height 135 pounds. FINDINGS:  FVC is 2.72, 108% predicted prebronchodilator and 2.83, 112%  predicted postbronchodilator. FEV1 is 1.36, 70% predicted  prebronchodilator and 1.43, 74% predicted postbronchodilator. FEF  25-75% is 1.08, 68% predicted prebronchodilator and 1.12, 70% predicted  postbronchodilator and ratio is 50, 64% predicted, both pre and  postbronchodilator. MVV is 100, 126% of predicted. SVC is 2.61, 103%  of predicted prebronchodilator and 2.51, 99% predicted  postbronchodilator. RV 3.32, 145% predicted prebronchodilator and 2.79,  122% predicted postbronchodilator. TLC is 5.93, 121% predicted  prebronchodilator and 5.3, 108% predicted postbronchodilator. Ratio is  56, 119% predicted prebronchodilator and 53, 112% predicted  postbronchodilator. DLCO is 14.89, 65% predicted and ratio 3.79, 80%  predicted. IMPRESSION:  Mild obstructive lung disease with near-significant air-  trapping. There is a mild reduction in DLCO which may indicate parenchymal   or vascular damage, difficulty with maneuvers may have affected the results. Please correlate clinically.         Kaylyn Alberts MD    D: 2022 19:29:34       T: 2022 19:32:01     [email or web address]  Job#: [id number]     Doc#: [id number]

## 2022-03-17 PROBLEM — Z86.16 HISTORY OF COVID-19: Status: ACTIVE | Noted: 2022-03-17

## 2024-11-12 ENCOUNTER — APPOINTMENT (OUTPATIENT)
Dept: GENERAL RADIOLOGY | Age: 79
End: 2024-11-12
Payer: MEDICARE

## 2024-11-12 ENCOUNTER — HOSPITAL ENCOUNTER (EMERGENCY)
Age: 79
Discharge: HOME | End: 2024-11-12
Payer: MEDICARE

## 2024-11-12 VITALS
TEMPERATURE: 97.7 F | SYSTOLIC BLOOD PRESSURE: 123 MMHG | RESPIRATION RATE: 20 BRPM | HEART RATE: 94 BPM | WEIGHT: 131 LBS | DIASTOLIC BLOOD PRESSURE: 93 MMHG | HEIGHT: 63 IN | OXYGEN SATURATION: 99 % | BODY MASS INDEX: 23.21 KG/M2

## 2024-11-12 DIAGNOSIS — S42.212A CLOSED DISPLACED FRACTURE OF SURGICAL NECK OF LEFT HUMERUS, UNSPECIFIED FRACTURE MORPHOLOGY, INITIAL ENCOUNTER: ICD-10-CM
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
## 2024-11-12 PROCEDURE — 73060 X-RAY EXAM OF HUMERUS: CPT

## 2024-11-12 PROCEDURE — 99283 EMERGENCY DEPT VISIT LOW MDM: CPT

## 2024-11-12 PROCEDURE — 6370000000 HC RX 637 (ALT 250 FOR IP): Performed by: NURSE PRACTITIONER

## 2024-11-12 RX ORDER — ACETAMINOPHEN AND CODEINE PHOSPHATE 300; 15 MG/1; MG/1
1 TABLET ORAL EVERY 6 HOURS PRN
Qty: 16 TABLET | Refills: 0 | Status: SHIPPED | OUTPATIENT
Start: 2024-11-12 | End: 2024-11-12

## 2024-11-12 RX ORDER — ACETAMINOPHEN AND CODEINE PHOSPHATE 300; 15 MG/1; MG/1
1 TABLET ORAL EVERY 6 HOURS PRN
Qty: 16 TABLET | Refills: 0 | Status: SHIPPED | OUTPATIENT
Start: 2024-11-12 | End: 2024-11-16

## 2024-11-12 RX ORDER — ACETAMINOPHEN 500 MG
1000 TABLET ORAL ONCE
Status: COMPLETED | OUTPATIENT
Start: 2024-11-12 | End: 2024-11-12

## 2024-11-12 RX ADMIN — ACETAMINOPHEN 1000 MG: 500 TABLET ORAL at 16:34

## 2024-11-12 ASSESSMENT — PAIN - FUNCTIONAL ASSESSMENT: PAIN_FUNCTIONAL_ASSESSMENT: 0-10

## 2024-11-12 ASSESSMENT — PAIN SCALES - GENERAL
PAINLEVEL_OUTOF10: 8
PAINLEVEL_OUTOF10: 0

## 2024-11-12 NOTE — ED PROVIDER NOTES
[facility]  Department of Emergency Medicine   ED  Encounter Note  Admit Date/RoomTime: 2024  2:53 PM  ED Room: DISPO/D01    NAME: Cathie Houston  : 1945  MRN: [id number]     Chief Complaint:  Fall (Denies LOC or thinners or head injury. Tripped and fell on concrete sidewalk) and Shoulder Injury (left)    History of Present Illness       Cathie Houston is a 79 y.o. old female who presents to the emergency department by private vehicle, for traumatic Left upper arm pain which occured a few minute(s) prior to arrival.  The complaint is due to tripped and fell landing on left arm.  Patient has no prior history of pain/injury with regards to today's visit. The patients tetanus status is up to date.   Since onset the symptoms have been stable.  Her pain is aggraveated by any movement and relieved by nothing. She denies any head injury, loss of consciousness, dizziness, neck pain, chest pain, or abdominal pain.    ROS   Pertinent positives and negatives are stated within HPI, all other systems reviewed and are negative.    Past Medical History:  has no past medical history on file.    Surgical History:  has a past surgical history that includes fracture surgery; Vaginal prolapse repair; Total abdominal hysterectomy w/ bilateral salpingoophorectomy; and tracheostomy ().    Social History:  reports that she has never smoked. She has never used smokeless tobacco. She reports that she does not drink alcohol and does not use drugs.    Family History: family history is not on file.     Allergies: Ciprofloxacin and Pcn [penicillins]    Physical Exam   Oxygen Saturation Interpretation: Normal.        ED Triage Vitals [24 1409]   BP Systolic BP Percentile Diastolic BP Percentile Temp Temp Source Pulse Respirations SpO2   (!) 123/93 -- -- 97.7 °F (36.5 °C) Oral 94 20 99 %      Height Weight - Scale         1.6 m (5' 3\") 59.4 kg (131 lb)               Constitutional:  Alert,